# Patient Record
Sex: FEMALE | Race: WHITE | NOT HISPANIC OR LATINO | URBAN - METROPOLITAN AREA
[De-identification: names, ages, dates, MRNs, and addresses within clinical notes are randomized per-mention and may not be internally consistent; named-entity substitution may affect disease eponyms.]

---

## 2017-01-18 ENCOUNTER — OUTPATIENT (OUTPATIENT)
Dept: OUTPATIENT SERVICES | Facility: HOSPITAL | Age: 67
LOS: 1 days | End: 2017-01-18

## 2017-12-06 ENCOUNTER — OUTPATIENT (OUTPATIENT)
Dept: OUTPATIENT SERVICES | Facility: HOSPITAL | Age: 67
LOS: 1 days | End: 2017-12-06

## 2020-01-26 ENCOUNTER — TRANSCRIPTION ENCOUNTER (OUTPATIENT)
Age: 70
End: 2020-01-26

## 2020-01-26 ENCOUNTER — INPATIENT (INPATIENT)
Facility: HOSPITAL | Age: 70
LOS: 2 days | Discharge: HOME | DRG: 481 | End: 2020-01-29
Attending: GENERAL ACUTE CARE HOSPITAL | Admitting: GENERAL ACUTE CARE HOSPITAL
Payer: MEDICARE

## 2020-01-26 VITALS
OXYGEN SATURATION: 99 % | TEMPERATURE: 98 F | HEART RATE: 110 BPM | WEIGHT: 145.95 LBS | DIASTOLIC BLOOD PRESSURE: 65 MMHG | SYSTOLIC BLOOD PRESSURE: 120 MMHG | HEIGHT: 62 IN | RESPIRATION RATE: 20 BRPM

## 2020-01-26 DIAGNOSIS — S72.009A FRACTURE OF UNSPECIFIED PART OF NECK OF UNSPECIFIED FEMUR, INITIAL ENCOUNTER FOR CLOSED FRACTURE: ICD-10-CM

## 2020-01-26 LAB
ALBUMIN SERPL ELPH-MCNC: 4.2 G/DL — SIGNIFICANT CHANGE UP (ref 3.3–5.2)
ALP SERPL-CCNC: 92 U/L — SIGNIFICANT CHANGE UP (ref 40–120)
ALT FLD-CCNC: 27 U/L — SIGNIFICANT CHANGE UP
ANION GAP SERPL CALC-SCNC: 14 MMOL/L — SIGNIFICANT CHANGE UP (ref 5–17)
APTT BLD: 28.3 SEC — SIGNIFICANT CHANGE UP (ref 27.5–36.3)
AST SERPL-CCNC: 69 U/L — HIGH
BASOPHILS # BLD AUTO: 0.03 K/UL — SIGNIFICANT CHANGE UP (ref 0–0.2)
BASOPHILS NFR BLD AUTO: 0.3 % — SIGNIFICANT CHANGE UP (ref 0–2)
BILIRUB SERPL-MCNC: 0.6 MG/DL — SIGNIFICANT CHANGE UP (ref 0.4–2)
BLD GP AB SCN SERPL QL: SIGNIFICANT CHANGE UP
BUN SERPL-MCNC: 13 MG/DL — SIGNIFICANT CHANGE UP (ref 8–20)
CALCIUM SERPL-MCNC: 9.7 MG/DL — SIGNIFICANT CHANGE UP (ref 8.6–10.2)
CHLORIDE SERPL-SCNC: 100 MMOL/L — SIGNIFICANT CHANGE UP (ref 98–107)
CO2 SERPL-SCNC: 19 MMOL/L — LOW (ref 22–29)
CREAT SERPL-MCNC: 0.72 MG/DL — SIGNIFICANT CHANGE UP (ref 0.5–1.3)
EOSINOPHIL # BLD AUTO: 0.12 K/UL — SIGNIFICANT CHANGE UP (ref 0–0.5)
EOSINOPHIL NFR BLD AUTO: 1.3 % — SIGNIFICANT CHANGE UP (ref 0–6)
GLUCOSE SERPL-MCNC: 108 MG/DL — HIGH (ref 70–99)
HCT VFR BLD CALC: 38.3 % — SIGNIFICANT CHANGE UP (ref 34.5–45)
HGB BLD-MCNC: 12.8 G/DL — SIGNIFICANT CHANGE UP (ref 11.5–15.5)
IMM GRANULOCYTES NFR BLD AUTO: 0.4 % — SIGNIFICANT CHANGE UP (ref 0–1.5)
INR BLD: 1.02 RATIO — SIGNIFICANT CHANGE UP (ref 0.88–1.16)
LYMPHOCYTES # BLD AUTO: 1.02 K/UL — SIGNIFICANT CHANGE UP (ref 1–3.3)
LYMPHOCYTES # BLD AUTO: 10.7 % — LOW (ref 13–44)
MCHC RBC-ENTMCNC: 29.9 PG — SIGNIFICANT CHANGE UP (ref 27–34)
MCHC RBC-ENTMCNC: 33.4 GM/DL — SIGNIFICANT CHANGE UP (ref 32–36)
MCV RBC AUTO: 89.5 FL — SIGNIFICANT CHANGE UP (ref 80–100)
MONOCYTES # BLD AUTO: 0.71 K/UL — SIGNIFICANT CHANGE UP (ref 0–0.9)
MONOCYTES NFR BLD AUTO: 7.4 % — SIGNIFICANT CHANGE UP (ref 2–14)
NEUTROPHILS # BLD AUTO: 7.65 K/UL — HIGH (ref 1.8–7.4)
NEUTROPHILS NFR BLD AUTO: 79.9 % — HIGH (ref 43–77)
PLATELET # BLD AUTO: 203 K/UL — SIGNIFICANT CHANGE UP (ref 150–400)
POTASSIUM SERPL-MCNC: 4 MMOL/L — SIGNIFICANT CHANGE UP (ref 3.5–5.3)
POTASSIUM SERPL-SCNC: 4 MMOL/L — SIGNIFICANT CHANGE UP (ref 3.5–5.3)
PROT SERPL-MCNC: 7.3 G/DL — SIGNIFICANT CHANGE UP (ref 6.6–8.7)
PROTHROM AB SERPL-ACNC: 11.7 SEC — SIGNIFICANT CHANGE UP (ref 10–12.9)
RBC # BLD: 4.28 M/UL — SIGNIFICANT CHANGE UP (ref 3.8–5.2)
RBC # FLD: 12.9 % — SIGNIFICANT CHANGE UP (ref 10.3–14.5)
SODIUM SERPL-SCNC: 133 MMOL/L — LOW (ref 135–145)
WBC # BLD: 9.57 K/UL — SIGNIFICANT CHANGE UP (ref 3.8–10.5)
WBC # FLD AUTO: 9.57 K/UL — SIGNIFICANT CHANGE UP (ref 3.8–10.5)

## 2020-01-26 PROCEDURE — 99223 1ST HOSP IP/OBS HIGH 75: CPT

## 2020-01-26 PROCEDURE — 99221 1ST HOSP IP/OBS SF/LOW 40: CPT | Mod: 57

## 2020-01-26 PROCEDURE — 99285 EMERGENCY DEPT VISIT HI MDM: CPT

## 2020-01-26 PROCEDURE — 93010 ELECTROCARDIOGRAM REPORT: CPT

## 2020-01-26 PROCEDURE — 73502 X-RAY EXAM HIP UNI 2-3 VIEWS: CPT | Mod: 26,RT

## 2020-01-26 PROCEDURE — 73070 X-RAY EXAM OF ELBOW: CPT | Mod: 26,RT

## 2020-01-26 PROCEDURE — 73552 X-RAY EXAM OF FEMUR 2/>: CPT | Mod: 26,RT

## 2020-01-26 PROCEDURE — 76377 3D RENDER W/INTRP POSTPROCES: CPT | Mod: 26

## 2020-01-26 PROCEDURE — 72192 CT PELVIS W/O DYE: CPT | Mod: 26

## 2020-01-26 RX ORDER — ASPIRIN/CALCIUM CARB/MAGNESIUM 324 MG
81 TABLET ORAL DAILY
Refills: 0 | Status: DISCONTINUED | OUTPATIENT
Start: 2020-01-26 | End: 2020-01-26

## 2020-01-26 RX ORDER — CEFAZOLIN SODIUM 1 G
2000 VIAL (EA) INJECTION ONCE
Refills: 0 | Status: DISCONTINUED | OUTPATIENT
Start: 2020-01-26 | End: 2020-01-27

## 2020-01-26 RX ORDER — MORPHINE SULFATE 50 MG/1
1 CAPSULE, EXTENDED RELEASE ORAL EVERY 6 HOURS
Refills: 0 | Status: DISCONTINUED | OUTPATIENT
Start: 2020-01-26 | End: 2020-01-26

## 2020-01-26 RX ORDER — OXYCODONE HYDROCHLORIDE 5 MG/1
5 TABLET ORAL EVERY 4 HOURS
Refills: 0 | Status: DISCONTINUED | OUTPATIENT
Start: 2020-01-26 | End: 2020-01-26

## 2020-01-26 RX ORDER — OXYCODONE HYDROCHLORIDE 5 MG/1
10 TABLET ORAL EVERY 4 HOURS
Refills: 0 | Status: DISCONTINUED | OUTPATIENT
Start: 2020-01-26 | End: 2020-01-26

## 2020-01-26 RX ORDER — ASPIRIN/CALCIUM CARB/MAGNESIUM 324 MG
1 TABLET ORAL
Qty: 0 | Refills: 0 | DISCHARGE

## 2020-01-26 RX ORDER — LEVOTHYROXINE SODIUM 125 MCG
100 TABLET ORAL DAILY
Refills: 0 | Status: DISCONTINUED | OUTPATIENT
Start: 2020-01-26 | End: 2020-01-27

## 2020-01-26 RX ORDER — MORPHINE SULFATE 50 MG/1
4 CAPSULE, EXTENDED RELEASE ORAL ONCE
Refills: 0 | Status: DISCONTINUED | OUTPATIENT
Start: 2020-01-26 | End: 2020-01-26

## 2020-01-26 RX ORDER — ENOXAPARIN SODIUM 100 MG/ML
40 INJECTION SUBCUTANEOUS ONCE
Refills: 0 | Status: COMPLETED | OUTPATIENT
Start: 2020-01-26 | End: 2020-01-26

## 2020-01-26 RX ORDER — NICOTINE POLACRILEX 2 MG
1 GUM BUCCAL DAILY
Refills: 0 | Status: DISCONTINUED | OUTPATIENT
Start: 2020-01-26 | End: 2020-01-27

## 2020-01-26 RX ORDER — ACETAMINOPHEN 500 MG
650 TABLET ORAL EVERY 6 HOURS
Refills: 0 | Status: DISCONTINUED | OUTPATIENT
Start: 2020-01-26 | End: 2020-01-27

## 2020-01-26 RX ORDER — OXYCODONE AND ACETAMINOPHEN 5; 325 MG/1; MG/1
1 TABLET ORAL ONCE
Refills: 0 | Status: DISCONTINUED | OUTPATIENT
Start: 2020-01-26 | End: 2020-01-26

## 2020-01-26 RX ORDER — ATORVASTATIN CALCIUM 80 MG/1
10 TABLET, FILM COATED ORAL AT BEDTIME
Refills: 0 | Status: DISCONTINUED | OUTPATIENT
Start: 2020-01-26 | End: 2020-01-27

## 2020-01-26 RX ORDER — ATORVASTATIN CALCIUM 80 MG/1
10 TABLET, FILM COATED ORAL AT BEDTIME
Refills: 0 | Status: DISCONTINUED | OUTPATIENT
Start: 2020-01-26 | End: 2020-01-26

## 2020-01-26 RX ORDER — OXYCODONE HYDROCHLORIDE 5 MG/1
5 TABLET ORAL EVERY 6 HOURS
Refills: 0 | Status: DISCONTINUED | OUTPATIENT
Start: 2020-01-26 | End: 2020-01-27

## 2020-01-26 RX ORDER — MORPHINE SULFATE 50 MG/1
2 CAPSULE, EXTENDED RELEASE ORAL EVERY 4 HOURS
Refills: 0 | Status: DISCONTINUED | OUTPATIENT
Start: 2020-01-26 | End: 2020-01-27

## 2020-01-26 RX ORDER — SODIUM CHLORIDE 9 MG/ML
1000 INJECTION INTRAMUSCULAR; INTRAVENOUS; SUBCUTANEOUS
Refills: 0 | Status: DISCONTINUED | OUTPATIENT
Start: 2020-01-26 | End: 2020-01-27

## 2020-01-26 RX ORDER — LEVOTHYROXINE SODIUM 125 MCG
100 TABLET ORAL DAILY
Refills: 0 | Status: DISCONTINUED | OUTPATIENT
Start: 2020-01-26 | End: 2020-01-26

## 2020-01-26 RX ORDER — ATORVASTATIN CALCIUM 80 MG/1
1 TABLET, FILM COATED ORAL
Qty: 0 | Refills: 0 | DISCHARGE

## 2020-01-26 RX ORDER — LEVOTHYROXINE SODIUM 125 MCG
1 TABLET ORAL
Qty: 0 | Refills: 0 | DISCHARGE

## 2020-01-26 RX ORDER — SENNA PLUS 8.6 MG/1
2 TABLET ORAL AT BEDTIME
Refills: 0 | Status: DISCONTINUED | OUTPATIENT
Start: 2020-01-26 | End: 2020-01-27

## 2020-01-26 RX ADMIN — MORPHINE SULFATE 4 MILLIGRAM(S): 50 CAPSULE, EXTENDED RELEASE ORAL at 20:23

## 2020-01-26 RX ADMIN — MORPHINE SULFATE 4 MILLIGRAM(S): 50 CAPSULE, EXTENDED RELEASE ORAL at 19:53

## 2020-01-26 RX ADMIN — OXYCODONE AND ACETAMINOPHEN 1 TABLET(S): 5; 325 TABLET ORAL at 15:57

## 2020-01-26 RX ADMIN — ATORVASTATIN CALCIUM 10 MILLIGRAM(S): 80 TABLET, FILM COATED ORAL at 22:38

## 2020-01-26 RX ADMIN — ENOXAPARIN SODIUM 40 MILLIGRAM(S): 100 INJECTION SUBCUTANEOUS at 22:38

## 2020-01-26 NOTE — ED ADULT NURSE NOTE - OBJECTIVE STATEMENT
pt care assumed at 1530, no apparent distress noted at this time. pt received A&OX3 resting in bed comfortably. pt s/p fall down 4 steps. pt states she was at neighbors house last night and slipped down the stairs. pt c/o increased pain on movement. pt has bruising to right elbow. pt educated on plan of care, pt able to successfully teach back plan of care to RN, RN will continue to reeducate pt during hospital stay.

## 2020-01-26 NOTE — CONSULT NOTE ADULT - ATTENDING COMMENTS
Ortho Trauma Attending:  Agree with above PA note.  Note edited where necessary.  Orthopedic Surgery is ready to proceed with surgery pending medical optimization and adequate Operating Room availability. Risks of surgical delay discussed with other providers and staff. Please call with any questions or concerns.     Randy Beaver MD  Orthopaedic Trauma Surgery

## 2020-01-26 NOTE — ED STATDOCS - OBJECTIVE STATEMENT
69 year old female presenting to ED s/p fall x1 day ago c/o hip injury. States she was at a party and fell. Denies head trauma, LOC. 69 year old female presenting to ED s/p fall x1 day ago c/o hip injury. States she was at a party and fell. Denies head trauma, LOC. Unable to walk since injury 2/2 pain. No knee pain. No pain in other leg. No weakness or numbness in leg. No back pain. No history of prior injury to leg

## 2020-01-26 NOTE — ED STATDOCS - CLINICAL SUMMARY MEDICAL DECISION MAKING FREE TEXT BOX
pt with right hip after fall, neurovascular intact, concern for fracture, obtain XR, pain control, reassess

## 2020-01-26 NOTE — H&P ADULT - NSHPPHYSICALEXAM_GEN_ALL_CORE
Vital Signs Last 24 Hrs  T(C): 36.8 (26 Jan 2020 13:56), Max: 36.8 (26 Jan 2020 13:56)  T(F): 98.2 (26 Jan 2020 13:56), Max: 98.2 (26 Jan 2020 13:56)  HR: 84 (27 Jan 2020 01:29) (84 - 110)  BP: 124/76 (27 Jan 2020 01:29) (120/65 - 124/76)  BP(mean): --  RR: 17 (27 Jan 2020 01:29) (17 - 20)  SpO2: 97% (27 Jan 2020 01:29) (97% - 99%)    GENERAL:  Well-appearing, not in acute distress  EYES:  Clear conjunctiva, extraocular movement intact  ENT: Moist mucous membranes  RESP:  Non-labored breathing pattern, lungs clear to ausculation   CV: Regular rate and rhythm, no murmurs appreciated, no lower extremity edema  GI: Soft, non-tender, non-distended  MSK:  Right hip pain, unable to lift right leg due to pain, able to wiggle toes of right foot  NEURO: Awake, alert, conversant  PSYCH: Calm, cooperative  SKIN: No rash or lesions, warm and dry

## 2020-01-26 NOTE — ED ADULT NURSE REASSESSMENT NOTE - NS ED NURSE REASSESS COMMENT FT1
report received from off going RN, pt received A+Ox4, in no apparent distress, family at bedside. pt breathing even and unlabored. skin w/d/i. +peripheral pulses, brisk cap refill. pt c/o pain to R hip, to be medicated as ordered. pending admit orders/admit bed at this time. pt educated on POC, verbalized understanding. pt safety measures maintained.

## 2020-01-26 NOTE — H&P ADULT - NSHPLABSRESULTS_GEN_ALL_CORE
12.8   9.57  )-----------( 203      ( 26 Jan 2020 18:01 )             38.3       01-26    133<L>  |  100  |  13.0  ----------------------------<  108<H>  4.0   |  19.0<L>  |  0.72    Ca    9.7      26 Jan 2020 18:01    TPro  7.3  /  Alb  4.2  /  TBili  0.6  /  DBili  x   /  AST  69<H>  /  ALT  27  /  AlkPhos  92  01-26    EKG:  NSR, HR 98

## 2020-01-26 NOTE — H&P ADULT - ASSESSMENT
69yoF hx hypothyroidism, HLD presenting with right hip and right elbow pain after mechanical fall after slipping while walking barefoot down wet stairs, found to have comminuted intratrochanteric fracture of the right hip on CT pelvis    Intratrochanteric fracture of the right hip due to mechanical fall  -Admit to medicine   -Pain control with Tylenol and morphine PRN and ice packs  -Maintenance IVF ordered   -Bed rest  -Seen by ortho in ED, plan for surgical intervention tmrw  -NPO after midnight in anticipation of surgery   -Vitals, labs and EKG reviewed- pt has mild hyponatremia but this is not contraindication for surgery  -Prior to fall, pt functional, independent, has >4 METS as, able to walk up 2 flights of steps wihth dyspnea  -Pt is medically optimized for orthopedic procedure    Right elbow swelling  -Likely due to contusion due to fall  -X-ray of elbow pending    Hyponatremia   -Mild, admission Na 133  -On maintenance IVF, trend BMP  -Since >130, is not contraindication for surgery    HLD  -On lipitor     Hypothyroidism  - check TSH, resumed synthroid    Active smoker  -Nicotine patch    Prophylactic measure  -Intermittent pneumatic compressions 69yoF hx hypothyroidism, HLD presenting with right hip and right elbow pain after mechanical fall after slipping while walking barefoot down wet stairs, found to have comminuted intratrochanteric fracture of the right hip on CT pelvis    Intratrochanteric fracture of the right hip due to mechanical fall  -Admit to medicine   -Pain control with Tylenol and morphine PRN and ice packs  -Maintenance IVF ordered   -Bed rest  -Seen by ortho in ED, plan for surgical intervention tmrw  -NPO after midnight in anticipation of surgery   -Vitals, labs and EKG reviewed- pt has mild hyponatremia but this is not contraindication for surgery  -Prior to fall, pt functional, independent, has >4 METS as, able to walk up 2 flights of steps with dyspnea, RCRI score is 0 points   -Pt is medically optimized for orthopedic procedure    Right elbow swelling  -Likely due to contusion due to fall  -X-ray of elbow pending    Hyponatremia   -Mild, admission Na 133  -On maintenance IVF, trend BMP  -Since >130, is not contraindication for surgery    HLD  -On lipitor     Hypothyroidism  - check TSH, resumed synthroid    Active smoker  -Nicotine patch    Prophylactic measure  -Intermittent pneumatic compressions  -Held ASA in anticipation of orthopedic procedure

## 2020-01-26 NOTE — CONSULT NOTE ADULT - ASSESSMENT
Patient is a 69 year old female with pmh of hypothyroidism, hyperlipidemia coming to hospital with complaints of hip pain s/p fall. patient stating was at a neighbor party yesterday night when was talking to neighbor and fell down stairs around 1am last night. states as hurt to walk neighbors brought her home. states this morning pain was worse and came to hospital. states also has some pain on right elbow. denies any loc. denies cp sob nausea vomiting or diarrhea. states pain located mainly on right hip increases with movement decreases with rest. is sharp currently 2/10.     #right hip pain   - admit to medicine - 2 betzaida  - 2/2 right hip fracture 2/2 mechanical fall   - pain mgmt   - ivf  - npo after midnight  - ortho consult appreciated  - patient advise may need rehab post surgery    #right elbow pain  - may be 2/2 contusion vs other pathology  - imaging pending    #hld  - lipitor     #hypothyroidism  - check tsh   - synthroid    #dvt prophylaxis  - venodynes for now given or tomorrow am - start chemical a/c once clear by ortho  IMPROVE VTE Individual Risk Assessment  RISK                                                                Points  [  ] Previous VTE                                                  3  [  ] Thrombophilia                                               2  [  ] Lower limb paralysis                                      2        (unable to hold up >15 seconds)    [  ] Current Cancer                                              2         (within 6 months)  [x  ] Immobilization > 24 hrs                                1  [  ] ICU/CCU stay > 24 hours                              1  [x  ] Age > 60                                                      1    IMPROVE VTE Score __2_______    IMPROVE Score 0-1: Low Risk, No VTE prophylaxis required for most patients, encourage ambulation.   IMPROVE Score 2-3: At risk, pharmacologic VTE prophylaxis is indicated for most patients (in the absence of a contraindication)  IMPROVE Score > or = 4: High Risk, pharmacologic VTE prophylaxis is indicated for most patients (in the absence of a contraindication) Patient is a 69 year old female with pmh of hypothyroidism, hyperlipidemia coming to hospital with complaints of hip pain s/p fall. patient stating was at a neighbor party yesterday night when was talking to neighbor and fell down stairs around 1am last night. states as hurt to walk neighbors brought her home. states this morning pain was worse and came to hospital. states also has some pain on right elbow. denies any loc. denies cp sob nausea vomiting or diarrhea. states pain located mainly on right hip increases with movement decreases with rest. is sharp currently 2/10.     #right hip pain   - admit to medicine - 2 betzaida  - 2/2 right hip fracture 2/2 mechanical fall   - pain mgmt   - ivf  - npo after midnight  - ortho consult appreciated  - patient advise may need rehab post surgery    #right elbow pain  - may be 2/2 contusion vs other pathology  - imaging pending    #hld  - lipitor     #hypothyroidism  - check tsh   - synthroid    #Preoperative Assessment  Revised Cardiac Risk Assessment   [  ]History of ischemic heart disease   [  ]History of congestive heart failure   [  ]History of cerebrovascular disease (stroke or transient ischemic attack)   [  ]History of diabetes requiring preoperative insulin use   [  ]Chronic kidney disease (creatinine > 2 mg/dL)   [  ]Undergoing suprainguinal vascular, intraperitoneal, or intrathoracic surgery     0 predictors = 0.4%, 1 predictor = 1.0%, 2 predictors = 2.4%, > 3 predictors = 5.4%    * Overall this patient has a   0.4  % risk of cardiac death, nonfatal myocardial infarction, and nonfatal cardiac arrest perioperatively.     Patient does not have any known history of severe valvular disease and is not in decompensated CHF. No recent MI. Baseline functional capacity is > 4 METS. Patient is currently hemodynamically stable. Patient is medically optimized at this time and understands the inherent risks of surgery.     #dvt prophylaxis  - venodynes for now given or tomorrow am - start chemical a/c once clear by ortho  IMPROVE VTE Individual Risk Assessment  RISK                                                                Points  [  ] Previous VTE                                                  3  [  ] Thrombophilia                                               2  [  ] Lower limb paralysis                                      2        (unable to hold up >15 seconds)    [  ] Current Cancer                                              2         (within 6 months)  [x  ] Immobilization > 24 hrs                                1  [  ] ICU/CCU stay > 24 hours                              1  [x  ] Age > 60                                                      1    IMPROVE VTE Score __2_______    IMPROVE Score 0-1: Low Risk, No VTE prophylaxis required for most patients, encourage ambulation.   IMPROVE Score 2-3: At risk, pharmacologic VTE prophylaxis is indicated for most patients (in the absence of a contraindication)  IMPROVE Score > or = 4: High Risk, pharmacologic VTE prophylaxis is indicated for most patients (in the absence of a contraindication)

## 2020-01-26 NOTE — ED ADULT TRIAGE NOTE - CHIEF COMPLAINT QUOTE
slipped on 2 steps 1 am, rt hip pain, managed to get in the car, rt elbow pain with abrasion also. Knee pain

## 2020-01-26 NOTE — CONSULT NOTE ADULT - SUBJECTIVE AND OBJECTIVE BOX
REASON FOR Tele-evaluation; right hip and elbow pain    SUBJECTIVE: right hip and elbow pain    HPI:  Patient is a 69 year old female with pmh of hypothyroidism, hyperlipidemia coming to hospital with complaints of hip pain s/p fall. patient stating was at a neighbor party yesterday night when was talking to neighbor and fell down stairs around 1am last night. states as hurt to walk neighbors brought her home. states this morning pain was worse and came to hospital. states also has some pain on right elbow. denies any loc. denies cp sob nausea vomiting or diarrhea. states pain located mainly on right hip increases with movement decreases with rest. is sharp currently 2/10.       Review of Systems:  CONSTITUTIONAL: No weakness, fevers or chills  EYES/ENT: No visual changes;  No vertigo or throat pain   NECK: No pain or stiffness  RESPIRATORY: No cough, wheezing, hemoptysis; No shortness of breath,   CARDIOVASCULAR: No chest pain or palpitations  GASTROINTESTINAL: No abdominal or epigastric pain. No nausea, vomiting, or hematemesis; No diarrhea or constipation.   GENITOURINARY: No dysuria, frequency or hematuria  NEUROLOGICAL: No numbness or weakness  MSK +right hip and elbow pain  SKIN: No itching, burning, rashes, or lesions   All other review of systems is negative unless indicated above    T(C): 36.8 (01-26-20 @ 13:56), Max: 36.8 (01-26-20 @ 13:56)  HR: 110 (01-26-20 @ 13:56) (110 - 110)  BP: 120/65 (01-26-20 @ 13:56) (120/65 - 120/65)  RR: 20 (01-26-20 @ 13:56) (20 - 20)  SpO2: 99% (01-26-20 @ 13:56) (99% - 99%)    PHYSICAL EXAM: evaluation precludes physical exam. Pertinent physical exam findings as per video conference with nurse at bedside is as follow:  Patient able to answer all questions appropriately able to move left side of body without pain; laying in bed comfortable aaox 3    Radiology findings   < from: Xray Femur 2 Views, Right (01.26.20 @ 16:38) >  IMPRESSION:   Intertrochanteric fracture of the right femur.  < end of copied text >    < from: Xray Hip w/ Pelvis 2 or 3 Views, Right (01.26.20 @ 16:38) >  IMPRESSION:   Intertrochanteric fracture of the right femur.  < end of copied text >    Medication reconcillitation done     Care plan discussed with Dr Vincent REASON FOR Tele-evaluation; right hip and elbow pain    SUBJECTIVE: right hip and elbow pain    HPI:  Patient is a 69 year old female with pmh of hypothyroidism, hyperlipidemia coming to hospital with complaints of hip pain s/p fall. patient stating was at a neighbor party yesterday night when was talking to neighbor and fell down stairs around 1am last night. states as hurt to walk neighbors brought her home. states this morning pain was worse and came to hospital. states also has some pain on right elbow. denies any loc. denies cp sob nausea vomiting or diarrhea. states pain located mainly on right hip increases with movement decreases with rest. is sharp currently 2/10.     PSH  - breast surgery - for abscess    Family History  - thyroid disorder    Social History  Lives at home with family  Current Smoker 1ppd x 50 years   Denies illicit drug use  Social etoh use    Review of Systems:  CONSTITUTIONAL: No weakness, fevers or chills  EYES/ENT: No visual changes;  No vertigo or throat pain   NECK: No pain or stiffness  RESPIRATORY: No cough, wheezing, hemoptysis; No shortness of breath,   CARDIOVASCULAR: No chest pain or palpitations  GASTROINTESTINAL: No abdominal or epigastric pain. No nausea, vomiting, or hematemesis; No diarrhea or constipation.   GENITOURINARY: No dysuria, frequency or hematuria  NEUROLOGICAL: No numbness or weakness  MSK +right hip and elbow pain  SKIN: No itching, burning, rashes, or lesions   All other review of systems is negative unless indicated above    T(C): 36.8 (01-26-20 @ 13:56), Max: 36.8 (01-26-20 @ 13:56)  HR: 110 (01-26-20 @ 13:56) (110 - 110)  BP: 120/65 (01-26-20 @ 13:56) (120/65 - 120/65)  RR: 20 (01-26-20 @ 13:56) (20 - 20)  SpO2: 99% (01-26-20 @ 13:56) (99% - 99%)    PHYSICAL EXAM: evaluation precludes physical exam. Pertinent physical exam findings as per video conference with nurse at bedside is as follow:  Patient able to answer all questions appropriately able to move left side of body without pain; laying in bed comfortable aaox 3    Radiology findings   < from: Xray Femur 2 Views, Right (01.26.20 @ 16:38) >  IMPRESSION:   Intertrochanteric fracture of the right femur.  < end of copied text >    < from: Xray Hip w/ Pelvis 2 or 3 Views, Right (01.26.20 @ 16:38) >  IMPRESSION:   Intertrochanteric fracture of the right femur.  < end of copied text >    Medication reconcillitation done     Care plan discussed with Dr Vincent

## 2020-01-26 NOTE — CONSULT NOTE ADULT - SUBJECTIVE AND OBJECTIVE BOX
Pt Name: DINORA COULTER    MRN: [id number]      Patient is a 69y Female presenting to the emergency department with a chief complaint of right hip and right elbow pain s/p fall last night. Patient states she slipped on steps on a porch leaving a party. Patient states she tried to walk on it and had pain but thought it was just bruised. Decided this morning it was too painful and came to ER. Patient states she smokes a pack a day.         REVIEW OF SYSTEMS      General: Denies any CP, SOB	    Musculoskeletal:	 see HPI     ROS is otherwise negative.      PAST MEDICAL & SURGICAL HISTORY:      Allergies: No Known Allergies      Medications: oxyCODONE    IR 5 milliGRAM(s) Oral every 4 hours PRN  oxyCODONE    IR 10 milliGRAM(s) Oral every 4 hours PRN      FAMILY HISTORY:  : non-contributory    Social History:     Ambulation: Walking independently prior to injury                          12.8   9.57  )-----------( 203      ( 26 Jan 2020 18:01 )             38.3             PHYSICAL EXAM:    Vital Signs Last 24 Hrs  T(C): 36.8 (26 Jan 2020 13:56), Max: 36.8 (26 Jan 2020 13:56)  T(F): 98.2 (26 Jan 2020 13:56), Max: 98.2 (26 Jan 2020 13:56)  HR: 110 (26 Jan 2020 13:56) (110 - 110)  BP: 120/65 (26 Jan 2020 13:56) (120/65 - 120/65)  BP(mean): --  RR: 20 (26 Jan 2020 13:56) (20 - 20)  SpO2: 99% (26 Jan 2020 13:56) (99% - 99%)  Daily Height in cm: 157.48 (26 Jan 2020 13:56)    Daily     Appearance: Alert, responsive, in no acute distress.  Neurological: Sensation is grossly intact to light touch. No focal deficits or weaknesses found.  Vascular: 2+ distal pulses. Cap refill < 2 sec. No signs of venous insuffiencey or stasis. No extremity ulcerations. No cyanosis.  Musculoskeletal:    Right Upper Extremity: Right elbow has full ROM with beginning ecchymosis and small abrasion   Right Lower Extremity: Patient laying on her left side with her left leg under her right lower leg supporting it. Patient has good DP pulses and brisk cap refill full sensation to crude touch. Patient states she does not want to lay on her back to allow me to complete my exam.     Imaging Studies:  EXAM:  XR FEMUR 2 VIEWS RT                         EXAM:  XR HIP WITH PELV 2-3V RT                          PROCEDURE DATE:  01/26/2020          INTERPRETATION:  Radiographs of the right hip     CPT 63045    CLINICAL INFORMATION: Status post fall complaining of right hip pain of unknown severity.    TECHNIQUE:   Frontal and extension views of the hip as well as an AP view of the pelvis were obtained.    FINDINGS:   No prior exams are available for comparison.    There is an intertrochanteric fracture of the right femur. The femoral head remains in articulation with the acetabulum. The sacroiliac joints appear symmetric bilaterally.    IMPRESSION:    Intertrochanteric fracture of the right hip.                Radiographs of the right femur   CPT 59664    CLINICAL INFORMATION:   Pain.    TECHNIQUE:  Frontal and limited cross table lateral views of the femur were obtained.    FINDINGS:   No previous examinations are available for review.    There is an intertrochanteric fracture of the right femur. Vascular calcifications are seen in the distal right femur.  No radiopaque foreign body is seen.    IMPRESSION:   Intertrochanteric fracture of the right femur.          RUBEN DE LA CRUZ M.D., ATTENDING RADIOLOGIST  This document has been electronically signed. Jan 26 2020  4:46PM                A/P:  Pt is a  69y Female with right hip fx, and right elbow contusion     PLAN:   * NPO for OR tomorrow  * IV fluids ordered and to start once NPO  * Pre-operative ABX ordered  * Single dose anticoagulation ordered  * Medical clearance requested for procedure  * Bed rest

## 2020-01-27 ENCOUNTER — TRANSCRIPTION ENCOUNTER (OUTPATIENT)
Age: 70
End: 2020-01-27

## 2020-01-27 DIAGNOSIS — Z98.890 OTHER SPECIFIED POSTPROCEDURAL STATES: Chronic | ICD-10-CM

## 2020-01-27 LAB
ABO RH CONFIRMATION: SIGNIFICANT CHANGE UP
ANION GAP SERPL CALC-SCNC: 11 MMOL/L — SIGNIFICANT CHANGE UP (ref 5–17)
BASOPHILS # BLD AUTO: 0.02 K/UL — SIGNIFICANT CHANGE UP (ref 0–0.2)
BASOPHILS NFR BLD AUTO: 0.3 % — SIGNIFICANT CHANGE UP (ref 0–2)
BUN SERPL-MCNC: 11 MG/DL — SIGNIFICANT CHANGE UP (ref 8–20)
CALCIUM SERPL-MCNC: 9.6 MG/DL — SIGNIFICANT CHANGE UP (ref 8.6–10.2)
CHLORIDE SERPL-SCNC: 104 MMOL/L — SIGNIFICANT CHANGE UP (ref 98–107)
CO2 SERPL-SCNC: 22 MMOL/L — SIGNIFICANT CHANGE UP (ref 22–29)
CREAT SERPL-MCNC: 0.72 MG/DL — SIGNIFICANT CHANGE UP (ref 0.5–1.3)
EOSINOPHIL # BLD AUTO: 0.02 K/UL — SIGNIFICANT CHANGE UP (ref 0–0.5)
EOSINOPHIL NFR BLD AUTO: 0.3 % — SIGNIFICANT CHANGE UP (ref 0–6)
GLUCOSE SERPL-MCNC: 118 MG/DL — HIGH (ref 70–99)
HCT VFR BLD CALC: 35.3 % — SIGNIFICANT CHANGE UP (ref 34.5–45)
HCV AB S/CO SERPL IA: 0.22 S/CO — SIGNIFICANT CHANGE UP (ref 0–0.99)
HCV AB SERPL-IMP: SIGNIFICANT CHANGE UP
HGB BLD-MCNC: 11.6 G/DL — SIGNIFICANT CHANGE UP (ref 11.5–15.5)
IMM GRANULOCYTES NFR BLD AUTO: 0.5 % — SIGNIFICANT CHANGE UP (ref 0–1.5)
LYMPHOCYTES # BLD AUTO: 1.21 K/UL — SIGNIFICANT CHANGE UP (ref 1–3.3)
LYMPHOCYTES # BLD AUTO: 19.5 % — SIGNIFICANT CHANGE UP (ref 13–44)
MCHC RBC-ENTMCNC: 29.7 PG — SIGNIFICANT CHANGE UP (ref 27–34)
MCHC RBC-ENTMCNC: 32.9 GM/DL — SIGNIFICANT CHANGE UP (ref 32–36)
MCV RBC AUTO: 90.5 FL — SIGNIFICANT CHANGE UP (ref 80–100)
MONOCYTES # BLD AUTO: 0.61 K/UL — SIGNIFICANT CHANGE UP (ref 0–0.9)
MONOCYTES NFR BLD AUTO: 9.8 % — SIGNIFICANT CHANGE UP (ref 2–14)
NEUTROPHILS # BLD AUTO: 4.31 K/UL — SIGNIFICANT CHANGE UP (ref 1.8–7.4)
NEUTROPHILS NFR BLD AUTO: 69.6 % — SIGNIFICANT CHANGE UP (ref 43–77)
PLATELET # BLD AUTO: 181 K/UL — SIGNIFICANT CHANGE UP (ref 150–400)
POTASSIUM SERPL-MCNC: 3.9 MMOL/L — SIGNIFICANT CHANGE UP (ref 3.5–5.3)
POTASSIUM SERPL-SCNC: 3.9 MMOL/L — SIGNIFICANT CHANGE UP (ref 3.5–5.3)
RBC # BLD: 3.9 M/UL — SIGNIFICANT CHANGE UP (ref 3.8–5.2)
RBC # FLD: 13 % — SIGNIFICANT CHANGE UP (ref 10.3–14.5)
SODIUM SERPL-SCNC: 137 MMOL/L — SIGNIFICANT CHANGE UP (ref 135–145)
TSH SERPL-MCNC: 8.16 UIU/ML — HIGH (ref 0.27–4.2)
WBC # BLD: 6.2 K/UL — SIGNIFICANT CHANGE UP (ref 3.8–10.5)
WBC # FLD AUTO: 6.2 K/UL — SIGNIFICANT CHANGE UP (ref 3.8–10.5)

## 2020-01-27 PROCEDURE — 27095 INJECTION FOR HIP X-RAY: CPT | Mod: RT

## 2020-01-27 PROCEDURE — 99233 SBSQ HOSP IP/OBS HIGH 50: CPT

## 2020-01-27 PROCEDURE — 73502 X-RAY EXAM HIP UNI 2-3 VIEWS: CPT | Mod: 26,RT

## 2020-01-27 PROCEDURE — 27245 TREAT THIGH FRACTURE: CPT | Mod: RT

## 2020-01-27 RX ORDER — OXYCODONE HYDROCHLORIDE 5 MG/1
10 TABLET ORAL
Refills: 0 | Status: DISCONTINUED | OUTPATIENT
Start: 2020-01-27 | End: 2020-01-29

## 2020-01-27 RX ORDER — SODIUM CHLORIDE 9 MG/ML
1000 INJECTION, SOLUTION INTRAVENOUS
Refills: 0 | Status: DISCONTINUED | OUTPATIENT
Start: 2020-01-27 | End: 2020-01-27

## 2020-01-27 RX ORDER — CELECOXIB 200 MG/1
200 CAPSULE ORAL
Refills: 0 | Status: DISCONTINUED | OUTPATIENT
Start: 2020-01-29 | End: 2020-01-29

## 2020-01-27 RX ORDER — SODIUM CHLORIDE 9 MG/ML
1000 INJECTION, SOLUTION INTRAVENOUS
Refills: 0 | Status: DISCONTINUED | OUTPATIENT
Start: 2020-01-27 | End: 2020-01-29

## 2020-01-27 RX ORDER — ONDANSETRON 8 MG/1
4 TABLET, FILM COATED ORAL ONCE
Refills: 0 | Status: DISCONTINUED | OUTPATIENT
Start: 2020-01-27 | End: 2020-01-27

## 2020-01-27 RX ORDER — SENNA PLUS 8.6 MG/1
2 TABLET ORAL AT BEDTIME
Refills: 0 | Status: DISCONTINUED | OUTPATIENT
Start: 2020-01-27 | End: 2020-01-29

## 2020-01-27 RX ORDER — LEVOTHYROXINE SODIUM 125 MCG
100 TABLET ORAL DAILY
Refills: 0 | Status: DISCONTINUED | OUTPATIENT
Start: 2020-01-27 | End: 2020-01-29

## 2020-01-27 RX ORDER — ATORVASTATIN CALCIUM 80 MG/1
10 TABLET, FILM COATED ORAL AT BEDTIME
Refills: 0 | Status: DISCONTINUED | OUTPATIENT
Start: 2020-01-27 | End: 2020-01-29

## 2020-01-27 RX ORDER — HYDROMORPHONE HYDROCHLORIDE 2 MG/ML
0.5 INJECTION INTRAMUSCULAR; INTRAVENOUS; SUBCUTANEOUS EVERY 4 HOURS
Refills: 0 | Status: DISCONTINUED | OUTPATIENT
Start: 2020-01-27 | End: 2020-01-27

## 2020-01-27 RX ORDER — OXYCODONE HYDROCHLORIDE 5 MG/1
5 TABLET ORAL
Refills: 0 | Status: DISCONTINUED | OUTPATIENT
Start: 2020-01-27 | End: 2020-01-29

## 2020-01-27 RX ORDER — ACETAMINOPHEN 500 MG
975 TABLET ORAL EVERY 8 HOURS
Refills: 0 | Status: DISCONTINUED | OUTPATIENT
Start: 2020-01-27 | End: 2020-01-29

## 2020-01-27 RX ORDER — FENTANYL CITRATE 50 UG/ML
25 INJECTION INTRAVENOUS
Refills: 0 | Status: DISCONTINUED | OUTPATIENT
Start: 2020-01-27 | End: 2020-01-27

## 2020-01-27 RX ORDER — HYDROMORPHONE HYDROCHLORIDE 2 MG/ML
1 INJECTION INTRAMUSCULAR; INTRAVENOUS; SUBCUTANEOUS EVERY 4 HOURS
Refills: 0 | Status: DISCONTINUED | OUTPATIENT
Start: 2020-01-27 | End: 2020-01-27

## 2020-01-27 RX ORDER — ASPIRIN/CALCIUM CARB/MAGNESIUM 324 MG
81 TABLET ORAL DAILY
Refills: 0 | Status: DISCONTINUED | OUTPATIENT
Start: 2020-01-28 | End: 2020-01-29

## 2020-01-27 RX ORDER — HYDROMORPHONE HYDROCHLORIDE 2 MG/ML
0.5 INJECTION INTRAMUSCULAR; INTRAVENOUS; SUBCUTANEOUS EVERY 4 HOURS
Refills: 0 | Status: DISCONTINUED | OUTPATIENT
Start: 2020-01-27 | End: 2020-01-29

## 2020-01-27 RX ORDER — ENOXAPARIN SODIUM 100 MG/ML
40 INJECTION SUBCUTANEOUS EVERY 24 HOURS
Refills: 0 | Status: DISCONTINUED | OUTPATIENT
Start: 2020-01-28 | End: 2020-01-29

## 2020-01-27 RX ADMIN — MORPHINE SULFATE 2 MILLIGRAM(S): 50 CAPSULE, EXTENDED RELEASE ORAL at 10:06

## 2020-01-27 RX ADMIN — OXYCODONE HYDROCHLORIDE 5 MILLIGRAM(S): 5 TABLET ORAL at 22:42

## 2020-01-27 RX ADMIN — Medication 975 MILLIGRAM(S): at 22:12

## 2020-01-27 RX ADMIN — Medication 975 MILLIGRAM(S): at 22:42

## 2020-01-27 RX ADMIN — HYDROMORPHONE HYDROCHLORIDE 1 MILLIGRAM(S): 2 INJECTION INTRAMUSCULAR; INTRAVENOUS; SUBCUTANEOUS at 11:48

## 2020-01-27 RX ADMIN — MORPHINE SULFATE 2 MILLIGRAM(S): 50 CAPSULE, EXTENDED RELEASE ORAL at 01:26

## 2020-01-27 RX ADMIN — Medication 100 MICROGRAM(S): at 05:38

## 2020-01-27 RX ADMIN — ATORVASTATIN CALCIUM 10 MILLIGRAM(S): 80 TABLET, FILM COATED ORAL at 22:12

## 2020-01-27 RX ADMIN — OXYCODONE HYDROCHLORIDE 5 MILLIGRAM(S): 5 TABLET ORAL at 07:54

## 2020-01-27 RX ADMIN — MORPHINE SULFATE 2 MILLIGRAM(S): 50 CAPSULE, EXTENDED RELEASE ORAL at 07:00

## 2020-01-27 RX ADMIN — SODIUM CHLORIDE 75 MILLILITER(S): 9 INJECTION INTRAMUSCULAR; INTRAVENOUS; SUBCUTANEOUS at 06:33

## 2020-01-27 RX ADMIN — MORPHINE SULFATE 2 MILLIGRAM(S): 50 CAPSULE, EXTENDED RELEASE ORAL at 11:30

## 2020-01-27 RX ADMIN — OXYCODONE HYDROCHLORIDE 5 MILLIGRAM(S): 5 TABLET ORAL at 22:12

## 2020-01-27 RX ADMIN — OXYCODONE HYDROCHLORIDE 5 MILLIGRAM(S): 5 TABLET ORAL at 08:15

## 2020-01-27 RX ADMIN — OXYCODONE HYDROCHLORIDE 5 MILLIGRAM(S): 5 TABLET ORAL at 18:22

## 2020-01-27 RX ADMIN — MORPHINE SULFATE 2 MILLIGRAM(S): 50 CAPSULE, EXTENDED RELEASE ORAL at 05:37

## 2020-01-27 RX ADMIN — SENNA PLUS 2 TABLET(S): 8.6 TABLET ORAL at 22:12

## 2020-01-27 NOTE — DISCHARGE NOTE PROVIDER - NSDCMRMEDTOKEN_GEN_ALL_CORE_FT
aspirin 81 mg oral tablet: 1 tab(s) orally once a day  Lipitor 10 mg oral tablet: 1 tab(s) orally once a day (at bedtime)  Synthroid 100 mcg (0.1 mg) oral tablet: 1 tab(s) orally once a day aspirin 81 mg oral tablet: 1 tab(s) orally once a day  Lipitor 10 mg oral tablet: 1 tab(s) orally once a day (at bedtime)  Lovenox 40 mg/0.4 mL injectable solution: 40 milligram(s) subcutaneously once a day   Multiple Vitamins oral tablet: 1 tab(s) orally once a day  oxyCODONE 10 mg oral tablet: 1 tab(s) orally every 3 hours, As needed, Moderate Pain (4 - 6)  oxyCODONE 5 mg oral tablet: 1 tab(s) orally every 6 hours, As Needed -Mild Pain (1 - 3) MDD:max 4 tabs daily  senna oral tablet: 2 tab(s) orally once a day (at bedtime), As Needed   Synthroid 100 mcg (0.1 mg) oral tablet: 1 tab(s) orally once a day

## 2020-01-27 NOTE — BRIEF OPERATIVE NOTE - NSICDXBRIEFPROCEDURE_GEN_ALL_CORE_FT
PROCEDURES:  Insertion of intramedullary nail for fixation of intertrochanteric fracture of femur 27-Jan-2020 13:55:26  Genna Chappell  Intramedullary nailing, femur, intertrochanteric, antegrade 27-Jan-2020 13:50:45  Genna Chappell

## 2020-01-27 NOTE — DISCHARGE NOTE PROVIDER - NSDCCPCAREPLAN_GEN_ALL_CORE_FT
PRINCIPAL DISCHARGE DIAGNOSIS  Diagnosis: Hip fracture  Assessment and Plan of Treatment: please take pain medication only as needed  you may also use NSAIDS for pain as needed  take senna as directed while on narcotics  please take lovenox injection as directed  follow up with Dr. Beaver within 1 week      SECONDARY DISCHARGE DIAGNOSES  Diagnosis: HLD (hyperlipidemia)  Assessment and Plan of Treatment: continue statin    Diagnosis: Hypothyroidism  Assessment and Plan of Treatment: please continue synthroid as discussed and follow up with endo as scheduled to repeat labs and discuss medication adjustment if needed

## 2020-01-27 NOTE — DISCHARGE NOTE PROVIDER - NSDCFUADDINST_GEN_ALL_CORE_FT
ORTHOPEDIC FOLLOW UP RECOMMENDATIONS: The patient will be seen in the office on 2/14/20 for wound check. Sutures/Staples/Tape will be removed at that time. Patient may shower after post-op day #5. The dressing is to be removed on post op day #9 (2/5/20). IF THE DRESSING BECOMES SOILED BEFORE THE REMOVAL DATE, CHANGE WITH A SIMILAR DRESSING. IF THE DRESSING BECOMES STAINED WITH DISCHARGE, CONTACT THE OFFICE FOR FURTHER DIRECTIONS.  The patient will contact the office if the wound becomes red, has increasing pain, develops bleeding or discharge, an injury occurs, or has other concerns. The patient will continue PT for gait training. The patient will continue LOVENOX for 4 weeks for blood clot prevention. The patient will take OXYCODONE AND TYLENOL for pain control and titrate according to prescription and patient needs. The patient will take Colace while taking oxycodone to prevent narcotic associated constipation.  Additionally, increase water intake (drink at least 8 glasses of water daily) and try adding fiber to the diet by eating fruits, vegetables and foods that are rich in grains. If constipation is experienced, contact the medical/primary care provider to discuss further treatment options. The patient is FULL weight bearing.

## 2020-01-27 NOTE — DISCHARGE NOTE PROVIDER - HOSPITAL COURSE
69yoF hx hypothyroidism, HLD presenting with right hip and right elbow pain after mechanical fall after slipping while walking barefoot down wet stairs, found to have comminuted intratrochanteric fracture of the right hip on CT pelvis s/p IMN.        Intratrochanteric fracture of the right hip due to mechanical fall    -s/p IMN POD #2    -Continue analgesia as prescribed     -PT evaluation - home    -dvt ppx as prescribed x 4 weeks    -ortho consult appreciated        Anemia    -likely due to blood loss; no large hematoma on exam    -Hb stable        Right elbow contusion    -X-ray of elbow - normal ROM, denies pain    -discussed with ortho PA; no further imaging required        Hyponatremia     -resolved s/p IVF        HLD    -On lipitor         Hypothyroidism    - TSH elevated; normal T3/T4    -  continue synthroid at current dose, outpatient follow up with endo as discussed with patient        Active smoker    -smoking cessation        Medically stable for discharge home. Time spent on discharge 45 minutes.

## 2020-01-27 NOTE — PROGRESS NOTE ADULT - SUBJECTIVE AND OBJECTIVE BOX
Orthopedic PA Postop Note  Patient S/P RIGHT HIP IM NAIL  Patient in bed comfortable   RIGHT  Leg  Dressing C/D/I  DP Pulse intact  Calf Soft NT  Dorsi/Plantar Flexion/EHL/FHL Intact  Sensation intact to light touch    Vital Signs Last 24 Hrs  T(C): 36.9 (27 Jan 2020 16:38), Max: 37.3 (27 Jan 2020 04:31)  T(F): 98.5 (27 Jan 2020 16:38), Max: 99.1 (27 Jan 2020 04:31)  HR: 109 (27 Jan 2020 16:38) (83 - 109)  BP: 119/62 (27 Jan 2020 16:38) (115/68 - 153/75)  BP(mean): --  RR: 20 (27 Jan 2020 16:38) (14 - 20)  SpO2: 94% (27 Jan 2020 16:38) (94% - 97%)      A/P:  S/P LEFT HIP IM NAIL  1. DVTP - LOVENOX  2. Physical Therapy   3. Pain Control as clinically indicated

## 2020-01-27 NOTE — PROGRESS NOTE ADULT - SUBJECTIVE AND OBJECTIVE BOX
CHIEF COMPLAINT/INTERVAL HISTORY:    Patient is a 69y old  Female who presents with a chief complaint of Comminuted intratrochanteric fracture of the right hip (26 Jan 2020 22:48)      HPI:  69yoF hx hypothyroidism, HLD presenting with mechanical fall causing right hip fracture.  Pt was at neighbor’s party last night, reports she had took off her shoes and had been walking barefoot to her house which was across the street but because the steps were slippery due to rain, she ended up falling down about 4 steps with impact to her right hip and elbow.  Pt denies any prodrome or loss of consciousness.  After the fall, she had significant right hip pain and difficulty bearing weight on her right leg.  She also endorses some right elbow pain and swelling. On admission, pt had X ray hip and femur and CT pelvis performed which showed comminuted intratrochanteric fracture of the right hip. (26 Jan 2020 22:48)      SUBJECTIVE & OBJECTIVE: Pt seen and examined at bedside.     ICU Vital Signs Last 24 Hrs  T(C): 36.4 (27 Jan 2020 12:05), Max: 37.3 (27 Jan 2020 04:31)  T(F): 97.6 (27 Jan 2020 12:05), Max: 99.1 (27 Jan 2020 04:31)  HR: 84 (27 Jan 2020 12:05) (84 - 98)  BP: 124/67 (27 Jan 2020 12:05) (115/68 - 129/77)  BP(mean): --  ABP: --  ABP(mean): --  RR: 16 (27 Jan 2020 12:05) (16 - 18)  SpO2: 95% (27 Jan 2020 12:05) (94% - 97%)        MEDICATIONS  (STANDING):    MEDICATIONS  (PRN):      LABS:                        11.6   6.20  )-----------( 181      ( 27 Jan 2020 07:01 )             35.3     01-27    137  |  104  |  11.0  ----------------------------<  118<H>  3.9   |  22.0  |  0.72    Ca    9.6      27 Jan 2020 07:01    TPro  7.3  /  Alb  4.2  /  TBili  0.6  /  DBili  x   /  AST  69<H>  /  ALT  27  /  AlkPhos  92  01-26    PT/INR - ( 26 Jan 2020 18:01 )   PT: 11.7 sec;   INR: 1.02 ratio         PTT - ( 26 Jan 2020 18:01 )  PTT:28.3 sec      PHYSICAL EXAM:    GENERAL: elderly female, laying in bed, NAD  HEAD:  Atraumatic, Normocephalic  EYES: EOMI, PERRLA, conjunctiva and sclera clear  ENMT: Moist mucous membranes  NECK: Supple   NERVOUS SYSTEM:  Alert & Oriented X3   CHEST/LUNG: Clear to auscultation bilaterally; No rales, rhonchi, wheezing, or rubs  HEART: Regular rate and rhythm; + S1/S2  ABDOMEN: Soft, Nontender, Nondistended; Bowel sounds present  EXTREMITIES:  no pedal edema

## 2020-01-27 NOTE — DISCHARGE NOTE PROVIDER - CARE PROVIDER_API CALL
Randy Beaver)  Orthopaedic Surgery  [address] [place] [address]  Phone: [phone]  Fax: [phone]  Follow Up Time:

## 2020-01-28 LAB
ANION GAP SERPL CALC-SCNC: 11 MMOL/L — SIGNIFICANT CHANGE UP (ref 5–17)
BASOPHILS # BLD AUTO: 0.03 K/UL — SIGNIFICANT CHANGE UP (ref 0–0.2)
BASOPHILS NFR BLD AUTO: 0.7 % — SIGNIFICANT CHANGE UP (ref 0–2)
BUN SERPL-MCNC: 9 MG/DL — SIGNIFICANT CHANGE UP (ref 8–20)
CALCIUM SERPL-MCNC: 8.7 MG/DL — SIGNIFICANT CHANGE UP (ref 8.6–10.2)
CHLORIDE SERPL-SCNC: 105 MMOL/L — SIGNIFICANT CHANGE UP (ref 98–107)
CO2 SERPL-SCNC: 23 MMOL/L — SIGNIFICANT CHANGE UP (ref 22–29)
CREAT SERPL-MCNC: 0.68 MG/DL — SIGNIFICANT CHANGE UP (ref 0.5–1.3)
EOSINOPHIL # BLD AUTO: 0.07 K/UL — SIGNIFICANT CHANGE UP (ref 0–0.5)
EOSINOPHIL NFR BLD AUTO: 1.6 % — SIGNIFICANT CHANGE UP (ref 0–6)
GLUCOSE SERPL-MCNC: 97 MG/DL — SIGNIFICANT CHANGE UP (ref 70–99)
HCT VFR BLD CALC: 30.3 % — LOW (ref 34.5–45)
HGB BLD-MCNC: 9.9 G/DL — LOW (ref 11.5–15.5)
IMM GRANULOCYTES NFR BLD AUTO: 0.5 % — SIGNIFICANT CHANGE UP (ref 0–1.5)
LYMPHOCYTES # BLD AUTO: 0.96 K/UL — LOW (ref 1–3.3)
LYMPHOCYTES # BLD AUTO: 21.9 % — SIGNIFICANT CHANGE UP (ref 13–44)
MAGNESIUM SERPL-MCNC: 1.6 MG/DL — SIGNIFICANT CHANGE UP (ref 1.6–2.6)
MCHC RBC-ENTMCNC: 30.6 PG — SIGNIFICANT CHANGE UP (ref 27–34)
MCHC RBC-ENTMCNC: 32.7 GM/DL — SIGNIFICANT CHANGE UP (ref 32–36)
MCV RBC AUTO: 93.5 FL — SIGNIFICANT CHANGE UP (ref 80–100)
MONOCYTES # BLD AUTO: 0.43 K/UL — SIGNIFICANT CHANGE UP (ref 0–0.9)
MONOCYTES NFR BLD AUTO: 9.8 % — SIGNIFICANT CHANGE UP (ref 2–14)
NEUTROPHILS # BLD AUTO: 2.88 K/UL — SIGNIFICANT CHANGE UP (ref 1.8–7.4)
NEUTROPHILS NFR BLD AUTO: 65.5 % — SIGNIFICANT CHANGE UP (ref 43–77)
PHOSPHATE SERPL-MCNC: 2 MG/DL — LOW (ref 2.4–4.7)
PLATELET # BLD AUTO: 149 K/UL — LOW (ref 150–400)
POTASSIUM SERPL-MCNC: 4.2 MMOL/L — SIGNIFICANT CHANGE UP (ref 3.5–5.3)
POTASSIUM SERPL-SCNC: 4.2 MMOL/L — SIGNIFICANT CHANGE UP (ref 3.5–5.3)
RBC # BLD: 3.24 M/UL — LOW (ref 3.8–5.2)
RBC # FLD: 13 % — SIGNIFICANT CHANGE UP (ref 10.3–14.5)
SODIUM SERPL-SCNC: 139 MMOL/L — SIGNIFICANT CHANGE UP (ref 135–145)
T3FREE SERPL-MCNC: 2.44 PG/ML — SIGNIFICANT CHANGE UP (ref 1.8–4.6)
T4 FREE SERPL-MCNC: 2.1 NG/DL — HIGH (ref 0.9–1.8)
WBC # BLD: 4.39 K/UL — SIGNIFICANT CHANGE UP (ref 3.8–10.5)
WBC # FLD AUTO: 4.39 K/UL — SIGNIFICANT CHANGE UP (ref 3.8–10.5)

## 2020-01-28 PROCEDURE — 99232 SBSQ HOSP IP/OBS MODERATE 35: CPT

## 2020-01-28 RX ADMIN — OXYCODONE HYDROCHLORIDE 10 MILLIGRAM(S): 5 TABLET ORAL at 13:53

## 2020-01-28 RX ADMIN — Medication 975 MILLIGRAM(S): at 05:55

## 2020-01-28 RX ADMIN — OXYCODONE HYDROCHLORIDE 10 MILLIGRAM(S): 5 TABLET ORAL at 10:27

## 2020-01-28 RX ADMIN — Medication 975 MILLIGRAM(S): at 13:53

## 2020-01-28 RX ADMIN — ENOXAPARIN SODIUM 40 MILLIGRAM(S): 100 INJECTION SUBCUTANEOUS at 05:55

## 2020-01-28 RX ADMIN — Medication 100 MICROGRAM(S): at 05:55

## 2020-01-28 RX ADMIN — OXYCODONE HYDROCHLORIDE 10 MILLIGRAM(S): 5 TABLET ORAL at 13:09

## 2020-01-28 RX ADMIN — OXYCODONE HYDROCHLORIDE 10 MILLIGRAM(S): 5 TABLET ORAL at 05:55

## 2020-01-28 RX ADMIN — Medication 975 MILLIGRAM(S): at 22:58

## 2020-01-28 RX ADMIN — Medication 975 MILLIGRAM(S): at 22:28

## 2020-01-28 RX ADMIN — Medication 975 MILLIGRAM(S): at 06:25

## 2020-01-28 RX ADMIN — OXYCODONE HYDROCHLORIDE 10 MILLIGRAM(S): 5 TABLET ORAL at 23:08

## 2020-01-28 RX ADMIN — OXYCODONE HYDROCHLORIDE 10 MILLIGRAM(S): 5 TABLET ORAL at 19:52

## 2020-01-28 RX ADMIN — Medication 1 TABLET(S): at 13:53

## 2020-01-28 RX ADMIN — SENNA PLUS 2 TABLET(S): 8.6 TABLET ORAL at 22:29

## 2020-01-28 RX ADMIN — OXYCODONE HYDROCHLORIDE 10 MILLIGRAM(S): 5 TABLET ORAL at 23:38

## 2020-01-28 RX ADMIN — OXYCODONE HYDROCHLORIDE 10 MILLIGRAM(S): 5 TABLET ORAL at 20:22

## 2020-01-28 RX ADMIN — OXYCODONE HYDROCHLORIDE 10 MILLIGRAM(S): 5 TABLET ORAL at 06:25

## 2020-01-28 RX ADMIN — ATORVASTATIN CALCIUM 10 MILLIGRAM(S): 80 TABLET, FILM COATED ORAL at 22:29

## 2020-01-28 RX ADMIN — Medication 62.5 MILLIMOLE(S): at 14:06

## 2020-01-28 RX ADMIN — Medication 81 MILLIGRAM(S): at 13:53

## 2020-01-28 NOTE — PHYSICAL THERAPY INITIAL EVALUATION ADULT - IMPAIRMENTS CONTRIBUTING TO GAIT DEVIATIONS, PT EVAL
impaired balance/decreased ROM/impaired coordination/pain/decreased flexibility/decreased strength/impaired motor control

## 2020-01-28 NOTE — PROGRESS NOTE ADULT - SUBJECTIVE AND OBJECTIVE BOX
DINORA COULTER    [id number]    History: Patient is status post right hip IM nail, POD#1.  Patient is doing well. The patient's pain is controlled using the prescribed pain medications. The patient will be participating in physical therapy, WBAT RLE. Denies nausea, vomiting, chest pain, shortness of breath, abdominal pain or fever. No new complaints.    Vital Signs Last 24 Hrs  T(C): 36.8 (28 Jan 2020 04:30), Max: 37.1 (27 Jan 2020 21:29)  T(F): 98.3 (28 Jan 2020 04:30), Max: 98.7 (27 Jan 2020 21:29)  HR: 97 (28 Jan 2020 04:30) (83 - 109)  BP: 117/68 (28 Jan 2020 04:30) (103/57 - 153/75)  BP(mean): --  RR: 18 (28 Jan 2020 04:30) (14 - 20)  SpO2: 95% (28 Jan 2020 04:30) (94% - 97%)                          11.6   6.20  )-----------( 181      ( 27 Jan 2020 07:01 )             35.3       01-27    137  |  104  |  11.0  ----------------------------<  118<H>  3.9   |  22.0  |  0.72    Ca    9.6      27 Jan 2020 07:01    TPro  7.3  /  Alb  4.2  /  TBili  0.6  /  DBili  x   /  AST  69<H>  /  ALT  27  /  AlkPhos  92  01-26      MEDICATIONS  (STANDING):  acetaminophen   Tablet .. 975 milliGRAM(s) Oral every 8 hours  aspirin  chewable 81 milliGRAM(s) Oral daily  atorvastatin 10 milliGRAM(s) Oral at bedtime  enoxaparin Injectable 40 milliGRAM(s) SubCutaneous every 24 hours  lactated ringers. 1000 milliLiter(s) (120 mL/Hr) IV Continuous <Continuous>  levothyroxine 100 MICROGram(s) Oral daily  multivitamin 1 Tablet(s) Oral daily  senna 2 Tablet(s) Oral at bedtime    MEDICATIONS  (PRN):  HYDROmorphone  Injectable 0.5 milliGRAM(s) IV Push every 4 hours PRN Breakthrough Pain  oxyCODONE    IR 10 milliGRAM(s) Oral every 3 hours PRN Moderate Pain (4 - 6)  oxyCODONE    IR 5 milliGRAM(s) Oral every 3 hours PRN Mild Pain (1 - 3)      Physical exam: The right hip surgical  dressing remains clean, dry and intact. No drainage or discharge noted on dressings. No erythema is noted. No blistering. No ecchymosis. The calf is supple nontender. Passive range of motion is acceptable to due postoperative pain. No calf tenderness. Sensation to light touch is grossly intact distally. Motor function distally is 5/5. No foot drop. 2+ dorsalis pedis pulse. Capillary refill is less than 2 seconds. No cyanosis.   Right elbow has area of ecchymosis at olecranon.  She has full painless range of motion. 5/5 strength    Primary Orthopedic Assessment:  • s/p RIGHT hip IM nail  Secondary  Orthopedic Assessment(s):   •     Secondary  Medical Assessment(s):   •     Plan:   • DVT prophylaxis as prescribed, including use of compression devices and ankle pumps  • Continue physical therapy, WBAT  • Weightbearing as tolerated of the right lower extremity with assistance of a walker  • Incentive spirometry encouraged  • Pain control as clinically indicated  • Discharge planning – anticipated discharge is Home once cleared by Physical therapy and medicine

## 2020-01-28 NOTE — PHYSICAL THERAPY INITIAL EVALUATION ADULT - CRITERIA FOR SKILLED THERAPEUTIC INTERVENTIONS
impairments found/functional limitations in following categories/therapy frequency/anticipated equipment needs at discharge/anticipated discharge recommendation/predicted duration of therapy intervention/risk reduction/prevention/rehab potential

## 2020-01-28 NOTE — PHYSICAL THERAPY INITIAL EVALUATION ADULT - ACTIVE RANGE OF MOTION EXAMINATION, REHAB EVAL
Right knee flexion to 90 degrees/bilateral  lower extremity Active ROM was WFL (within functional limits)

## 2020-01-28 NOTE — PROGRESS NOTE ADULT - SUBJECTIVE AND OBJECTIVE BOX
CHIEF COMPLAINT/INTERVAL HISTORY:    Patient is a 69y old  Female who presents with a chief complaint of Comminuted intratrochanteric fracture of the right hip (28 Jan 2020 07:18)      HPI:  69yoF hx hypothyroidism, HLD presenting with mechanical fall causing right hip fracture.  Pt was at neighbor’s party last night, reports she had took off her shoes and had been walking barefoot to her house which was across the street but because the steps were slippery due to rain, she ended up falling down about 4 steps with impact to her right hip and elbow.  Pt denies any prodrome or loss of consciousness.  After the fall, she had significant right hip pain and difficulty bearing weight on her right leg.  She also endorses some right elbow pain and swelling. On admission, pt had X ray hip and femur and CT pelvis performed which showed comminuted intratrochanteric fracture of the right hip. (26 Jan 2020 22:48)      SUBJECTIVE & OBJECTIVE: Pt seen and examined at bedside.     ICU Vital Signs Last 24 Hrs  T(C): 36.8 (28 Jan 2020 16:25), Max: 37.1 (27 Jan 2020 21:29)  T(F): 98.2 (28 Jan 2020 16:25), Max: 98.7 (27 Jan 2020 21:29)  HR: 87 (28 Jan 2020 16:25) (84 - 97)  BP: 100/57 (28 Jan 2020 16:25) (93/57 - 121/67)  BP(mean): --  ABP: --  ABP(mean): --  RR: 17 (28 Jan 2020 16:25) (17 - 19)  SpO2: 95% (28 Jan 2020 16:25) (90% - 96%)        MEDICATIONS  (STANDING):  acetaminophen   Tablet .. 975 milliGRAM(s) Oral every 8 hours  aspirin  chewable 81 milliGRAM(s) Oral daily  atorvastatin 10 milliGRAM(s) Oral at bedtime  enoxaparin Injectable 40 milliGRAM(s) SubCutaneous every 24 hours  lactated ringers. 1000 milliLiter(s) (120 mL/Hr) IV Continuous <Continuous>  levothyroxine 100 MICROGram(s) Oral daily  multivitamin 1 Tablet(s) Oral daily  senna 2 Tablet(s) Oral at bedtime    MEDICATIONS  (PRN):  HYDROmorphone  Injectable 0.5 milliGRAM(s) IV Push every 4 hours PRN Breakthrough Pain  oxyCODONE    IR 10 milliGRAM(s) Oral every 3 hours PRN Moderate Pain (4 - 6)  oxyCODONE    IR 5 milliGRAM(s) Oral every 3 hours PRN Mild Pain (1 - 3)      LABS:                        9.9    4.39  )-----------( 149      ( 28 Jan 2020 07:14 )             30.3     01-28    139  |  105  |  9.0  ----------------------------<  97  4.2   |  23.0  |  0.68    Ca    8.7      28 Jan 2020 07:14  Phos  2.0     01-28  Mg     1.6     01-28    TPro  7.3  /  Alb  4.2  /  TBili  0.6  /  DBili  x   /  AST  69<H>  /  ALT  27  /  AlkPhos  92  01-26    PT/INR - ( 26 Jan 2020 18:01 )   PT: 11.7 sec;   INR: 1.02 ratio         PTT - ( 26 Jan 2020 18:01 )  PTT:28.3 sec      CAPILLARY BLOOD GLUCOSE          RECENT CULTURES:      RADIOLOGY & ADDITIONAL TESTS:      PHYSICAL EXAM:    GENERAL: NAD, well-groomed, well-developed  HEAD:  Atraumatic, Normocephalic  EYES: EOMI, PERRLA, conjunctiva and sclera clear  ENMT: Moist mucous membranes  NECK: Supple, No JVD  NERVOUS SYSTEM:  Alert & Oriented X3, Motor Strength 5/5 B/L upper and lower extremities; DTRs 2+ intact and symmetric  CHEST/LUNG: Clear to auscultation bilaterally; No rales, rhonchi, wheezing, or rubs  HEART: Regular rate and rhythm; No murmurs, rubs, or gallops  ABDOMEN: Soft, Nontender, Nondistended; Bowel sounds present  EXTREMITIES:  2+ Peripheral Pulses, No clubbing, cyanosis, or edema        DVT/GI ppx  Discussed with pt @ bedside CHIEF COMPLAINT/INTERVAL HISTORY:    Patient is a 69y old  Female who presents with a chief complaint of Comminuted intratrochanteric fracture of the right hip (28 Jan 2020 07:18)    SUBJECTIVE & OBJECTIVE: Pt seen and examined at bedside. No overnight events. Pain controlled. PT evaluation today. Drop in Hb noted; no hematoma. Will continue to monitor.    ROS: No chest pain, palpitations, SOB, light headedness, dizziness, headache, nausea/vomiting, fevers/chills, abdominal pain, dysuria.    ICU Vital Signs Last 24 Hrs  T(C): 36.8 (28 Jan 2020 16:25), Max: 37.1 (27 Jan 2020 21:29)  T(F): 98.2 (28 Jan 2020 16:25), Max: 98.7 (27 Jan 2020 21:29)  HR: 87 (28 Jan 2020 16:25) (84 - 97)  BP: 100/57 (28 Jan 2020 16:25) (93/57 - 121/67)  RR: 17 (28 Jan 2020 16:25) (17 - 19)  SpO2: 95% (28 Jan 2020 16:25) (90% - 96%)    MEDICATIONS  (STANDING):  acetaminophen   Tablet .. 975 milliGRAM(s) Oral every 8 hours  aspirin  chewable 81 milliGRAM(s) Oral daily  atorvastatin 10 milliGRAM(s) Oral at bedtime  enoxaparin Injectable 40 milliGRAM(s) SubCutaneous every 24 hours  lactated ringers. 1000 milliLiter(s) (120 mL/Hr) IV Continuous <Continuous>  levothyroxine 100 MICROGram(s) Oral daily  multivitamin 1 Tablet(s) Oral daily  senna 2 Tablet(s) Oral at bedtime    MEDICATIONS  (PRN):  HYDROmorphone  Injectable 0.5 milliGRAM(s) IV Push every 4 hours PRN Breakthrough Pain  oxyCODONE    IR 10 milliGRAM(s) Oral every 3 hours PRN Moderate Pain (4 - 6)  oxyCODONE    IR 5 milliGRAM(s) Oral every 3 hours PRN Mild Pain (1 - 3)      LABS:                        9.9    4.39  )-----------( 149      ( 28 Jan 2020 07:14 )             30.3     01-28    139  |  105  |  9.0  ----------------------------<  97  4.2   |  23.0  |  0.68    Ca    8.7      28 Jan 2020 07:14  Phos  2.0     01-28  Mg     1.6     01-28    TPro  7.3  /  Alb  4.2  /  TBili  0.6  /  DBili  x   /  AST  69<H>  /  ALT  27  /  AlkPhos  92  01-26    PT/INR - ( 26 Jan 2020 18:01 )   PT: 11.7 sec;   INR: 1.02 ratio         PTT - ( 26 Jan 2020 18:01 )  PTT:28.3 sec      CAPILLARY BLOOD GLUCOSE    PHYSICAL EXAM:    GENERAL: elderly female, sitting in chair, NAD  HEAD:  Atraumatic, Normocephalic  EYES: EOMI, PERRLA, conjunctiva and sclera clear  ENMT: Moist mucous membranes  NECK: Supple   NERVOUS SYSTEM:  Alert & Oriented X3   CHEST/LUNG: Clear to auscultation bilaterally; No rales, rhonchi, wheezing, or rubs  HEART: Regular rate and rhythm; + S1/S2  ABDOMEN: Soft, Nontender, Nondistended; Bowel sounds present  EXTREMITIES: no pedal edema, right hip dressings c/d/i

## 2020-01-28 NOTE — PROGRESS NOTE ADULT - ASSESSMENT
69yoF hx hypothyroidism, HLD presenting with right hip and right elbow pain after mechanical fall after slipping while walking barefoot down wet stairs, found to have comminuted intratrochanteric fracture of the right hip on CT pelvis    Intratrochanteric fracture of the right hip due to mechanical fall  -Continue analgesia as prescribed; add IV dilaudid for breakthrough     -medically optimized for OR today  -dvt ppx once cleared by ortho  -ortho consult appreciated    Right elbow contusion  -X-ray of elbow ? radial neck fracture  -discussed with ortho PA; will review imaging and discuss with attending    Hyponatremia   -resolved s/p IVF    HLD  -On lipitor     Hypothyroidism  - TSH elevated; check free T3/T4  -  continue synthroid    Active smoker  -Nicotine patch    DVT ppx - SCDs; OR today
69yoF hx hypothyroidism, HLD presenting with right hip and right elbow pain after mechanical fall after slipping while walking barefoot down wet stairs, found to have comminuted intratrochanteric fracture of the right hip on CT pelvis s/p IMN.    Intratrochanteric fracture of the right hip due to mechanical fall  -s/p IMN POD #1  -Continue analgesia as prescribed   -PT evaluation; probable home pending stair training on 1/29  -dvt ppx as prescribed  -ortho consult appreciated    Anemia  -likely due to blood loss; no large hematoma on exam  -monitor H/H     Right elbow contusion  -X-ray of elbow - normal ROM, denies pain  -discussed with ortho PA; no further imaging required    Hyponatremia   -resolved s/p IVF    HLD  -On lipitor     Hypothyroidism  - TSH elevated; normal T3/T4  -  continue synthroid at current dose, outpatient follow up with endo    Active smoker  -Nicotine patch    DVT ppx - Lovenox SC

## 2020-01-28 NOTE — PHYSICAL THERAPY INITIAL EVALUATION ADULT - IMPAIRMENTS FOUND, PT EVAL
muscle strength/neuromotor development and sensory integration/ROM/aerobic capacity/endurance/gait, locomotion, and balance

## 2020-01-28 NOTE — PROVIDER CONTACT NOTE (OTHER) - REASON
Chart Reviewed,. Events noted for Surgical Intervention. PT request updated MD orders for PT Consult

## 2020-01-28 NOTE — OCCUPATIONAL THERAPY INITIAL EVALUATION ADULT - LEVEL OF INDEPENDENCE:TOILET, OT EVAL
minimum assist (75% patients effort)/+urination on toilet during session; pt able to perform tasks in sitting with supervision however requiring assistance with standing tasks for balance

## 2020-01-28 NOTE — PHYSICAL THERAPY INITIAL EVALUATION ADULT - LIVES WITH, PROFILE
spouse/Pt lives with spouse in private home, 1 BRODIE with rails (back entrance), 5 steps inside which pt does not plan on doing

## 2020-01-28 NOTE — PHYSICAL THERAPY INITIAL EVALUATION ADULT - PLANNED THERAPY INTERVENTIONS, PT EVAL
transfer training/gait training/bed mobility training/balance training/strengthening/motor coordination training/neuromuscular re-education/ROM/stretching/postural re-education

## 2020-01-28 NOTE — OCCUPATIONAL THERAPY INITIAL EVALUATION ADULT - PERTINENT HX OF CURRENT PROBLEM, REHAB EVAL
Pt presents to ED with c/o right hip and right elbow pain s/p fall the night prior. CT reveals comminuted intratrochanteric fracture of the right hip.

## 2020-01-28 NOTE — OCCUPATIONAL THERAPY INITIAL EVALUATION ADULT - ADDITIONAL COMMENTS
Pt lives in house with 1 BRODIE and no steps inside; pt will be staying on main level upon discharge. Bathroom has bathtub with doors. Pt owns RW. Pt is right handed. Pt drives. Pt's  is able and available to assist upon discharge.

## 2020-01-28 NOTE — PHYSICAL THERAPY INITIAL EVALUATION ADULT - ADDITIONAL COMMENTS
Pt reports independent PTA, owns no DME. Pt lives in split level home, however, reports she will stay on bottom half to avoid 5 steps. +. Spouse is available to assist as needed

## 2020-01-29 ENCOUNTER — TRANSCRIPTION ENCOUNTER (OUTPATIENT)
Age: 70
End: 2020-01-29

## 2020-01-29 VITALS
DIASTOLIC BLOOD PRESSURE: 70 MMHG | RESPIRATION RATE: 18 BRPM | HEART RATE: 96 BPM | TEMPERATURE: 97 F | OXYGEN SATURATION: 90 % | SYSTOLIC BLOOD PRESSURE: 109 MMHG

## 2020-01-29 LAB
ANION GAP SERPL CALC-SCNC: 12 MMOL/L — SIGNIFICANT CHANGE UP (ref 5–17)
BUN SERPL-MCNC: 9 MG/DL — SIGNIFICANT CHANGE UP (ref 8–20)
CALCIUM SERPL-MCNC: 9.4 MG/DL — SIGNIFICANT CHANGE UP (ref 8.6–10.2)
CHLORIDE SERPL-SCNC: 104 MMOL/L — SIGNIFICANT CHANGE UP (ref 98–107)
CO2 SERPL-SCNC: 24 MMOL/L — SIGNIFICANT CHANGE UP (ref 22–29)
CREAT SERPL-MCNC: 0.59 MG/DL — SIGNIFICANT CHANGE UP (ref 0.5–1.3)
GLUCOSE SERPL-MCNC: 96 MG/DL — SIGNIFICANT CHANGE UP (ref 70–99)
HCT VFR BLD CALC: 31.2 % — LOW (ref 34.5–45)
HGB BLD-MCNC: 10.2 G/DL — LOW (ref 11.5–15.5)
MCHC RBC-ENTMCNC: 30.7 PG — SIGNIFICANT CHANGE UP (ref 27–34)
MCHC RBC-ENTMCNC: 32.7 GM/DL — SIGNIFICANT CHANGE UP (ref 32–36)
MCV RBC AUTO: 94 FL — SIGNIFICANT CHANGE UP (ref 80–100)
PLATELET # BLD AUTO: 156 K/UL — SIGNIFICANT CHANGE UP (ref 150–400)
POTASSIUM SERPL-MCNC: 4.3 MMOL/L — SIGNIFICANT CHANGE UP (ref 3.5–5.3)
POTASSIUM SERPL-SCNC: 4.3 MMOL/L — SIGNIFICANT CHANGE UP (ref 3.5–5.3)
RBC # BLD: 3.32 M/UL — LOW (ref 3.8–5.2)
RBC # FLD: 12.9 % — SIGNIFICANT CHANGE UP (ref 10.3–14.5)
SODIUM SERPL-SCNC: 140 MMOL/L — SIGNIFICANT CHANGE UP (ref 135–145)
WBC # BLD: 5.11 K/UL — SIGNIFICANT CHANGE UP (ref 3.8–10.5)
WBC # FLD AUTO: 5.11 K/UL — SIGNIFICANT CHANGE UP (ref 3.8–10.5)

## 2020-01-29 PROCEDURE — 72192 CT PELVIS W/O DYE: CPT

## 2020-01-29 PROCEDURE — 93005 ELECTROCARDIOGRAM TRACING: CPT

## 2020-01-29 PROCEDURE — 99239 HOSP IP/OBS DSCHRG MGMT >30: CPT

## 2020-01-29 PROCEDURE — 85610 PROTHROMBIN TIME: CPT

## 2020-01-29 PROCEDURE — 80048 BASIC METABOLIC PNL TOTAL CA: CPT

## 2020-01-29 PROCEDURE — 83735 ASSAY OF MAGNESIUM: CPT

## 2020-01-29 PROCEDURE — 76000 FLUOROSCOPY <1 HR PHYS/QHP: CPT

## 2020-01-29 PROCEDURE — 73502 X-RAY EXAM HIP UNI 2-3 VIEWS: CPT

## 2020-01-29 PROCEDURE — 84481 FREE ASSAY (FT-3): CPT

## 2020-01-29 PROCEDURE — 84439 ASSAY OF FREE THYROXINE: CPT

## 2020-01-29 PROCEDURE — 99285 EMERGENCY DEPT VISIT HI MDM: CPT | Mod: 25

## 2020-01-29 PROCEDURE — C1713: CPT

## 2020-01-29 PROCEDURE — 96374 THER/PROPH/DIAG INJ IV PUSH: CPT

## 2020-01-29 PROCEDURE — 86850 RBC ANTIBODY SCREEN: CPT

## 2020-01-29 PROCEDURE — 76377 3D RENDER W/INTRP POSTPROCES: CPT

## 2020-01-29 PROCEDURE — 86901 BLOOD TYPING SEROLOGIC RH(D): CPT

## 2020-01-29 PROCEDURE — 84100 ASSAY OF PHOSPHORUS: CPT

## 2020-01-29 PROCEDURE — 97167 OT EVAL HIGH COMPLEX 60 MIN: CPT

## 2020-01-29 PROCEDURE — 84443 ASSAY THYROID STIM HORMONE: CPT

## 2020-01-29 PROCEDURE — 36415 COLL VENOUS BLD VENIPUNCTURE: CPT

## 2020-01-29 PROCEDURE — 73552 X-RAY EXAM OF FEMUR 2/>: CPT

## 2020-01-29 PROCEDURE — 86900 BLOOD TYPING SEROLOGIC ABO: CPT

## 2020-01-29 PROCEDURE — 85027 COMPLETE CBC AUTOMATED: CPT

## 2020-01-29 PROCEDURE — 80053 COMPREHEN METABOLIC PANEL: CPT

## 2020-01-29 PROCEDURE — 73070 X-RAY EXAM OF ELBOW: CPT

## 2020-01-29 PROCEDURE — 85730 THROMBOPLASTIN TIME PARTIAL: CPT

## 2020-01-29 PROCEDURE — 86803 HEPATITIS C AB TEST: CPT

## 2020-01-29 RX ORDER — OXYCODONE HYDROCHLORIDE 5 MG/1
1 TABLET ORAL
Qty: 20 | Refills: 0
Start: 2020-01-29 | End: 2020-02-02

## 2020-01-29 RX ORDER — SENNA PLUS 8.6 MG/1
2 TABLET ORAL
Qty: 14 | Refills: 0
Start: 2020-01-29 | End: 2020-02-04

## 2020-01-29 RX ORDER — OXYCODONE HYDROCHLORIDE 5 MG/1
1 TABLET ORAL
Qty: 0 | Refills: 0 | DISCHARGE
Start: 2020-01-29

## 2020-01-29 RX ORDER — ENOXAPARIN SODIUM 100 MG/ML
40 INJECTION SUBCUTANEOUS
Qty: 30 | Refills: 0
Start: 2020-01-29 | End: 2020-02-27

## 2020-01-29 RX ADMIN — CELECOXIB 200 MILLIGRAM(S): 200 CAPSULE ORAL at 05:42

## 2020-01-29 RX ADMIN — Medication 1 TABLET(S): at 11:03

## 2020-01-29 RX ADMIN — Medication 100 MICROGRAM(S): at 05:42

## 2020-01-29 RX ADMIN — OXYCODONE HYDROCHLORIDE 10 MILLIGRAM(S): 5 TABLET ORAL at 13:34

## 2020-01-29 RX ADMIN — OXYCODONE HYDROCHLORIDE 5 MILLIGRAM(S): 5 TABLET ORAL at 11:26

## 2020-01-29 RX ADMIN — Medication 81 MILLIGRAM(S): at 11:03

## 2020-01-29 RX ADMIN — Medication 975 MILLIGRAM(S): at 06:12

## 2020-01-29 RX ADMIN — OXYCODONE HYDROCHLORIDE 10 MILLIGRAM(S): 5 TABLET ORAL at 14:41

## 2020-01-29 RX ADMIN — OXYCODONE HYDROCHLORIDE 10 MILLIGRAM(S): 5 TABLET ORAL at 06:12

## 2020-01-29 RX ADMIN — OXYCODONE HYDROCHLORIDE 10 MILLIGRAM(S): 5 TABLET ORAL at 05:43

## 2020-01-29 RX ADMIN — ENOXAPARIN SODIUM 40 MILLIGRAM(S): 100 INJECTION SUBCUTANEOUS at 05:42

## 2020-01-29 RX ADMIN — Medication 975 MILLIGRAM(S): at 05:42

## 2020-01-29 RX ADMIN — CELECOXIB 200 MILLIGRAM(S): 200 CAPSULE ORAL at 06:12

## 2020-01-29 RX ADMIN — OXYCODONE HYDROCHLORIDE 5 MILLIGRAM(S): 5 TABLET ORAL at 11:03

## 2020-01-29 NOTE — DISCHARGE NOTE NURSING/CASE MANAGEMENT/SOCIAL WORK - PATIENT PORTAL LINK FT
You can access the FollowMyHealth Patient Portal offered by [facility] by registering at the following website: [email or web address]. By joining [employer]’s FollowMyHealth portal, you will also be able to view your health information using other applications (apps) compatible with our system.

## 2020-01-29 NOTE — PROGRESS NOTE ADULT - SUBJECTIVE AND OBJECTIVE BOX
DINORA COULTER    [id number]    History: Patient is status post right hip IM nail, POD#2.  Patient is doing well. The patient's pain is controlled using the prescribed pain medications. The patient is participating in physical therapy, WBAT.  Denies nausea, vomiting, chest pain, shortness of breath, abdominal pain or fever. No new complaints.    Vital Signs Last 24 Hrs  T(C): 36.7 (29 Jan 2020 04:56), Max: 37 (28 Jan 2020 13:47)  T(F): 98.1 (29 Jan 2020 04:56), Max: 98.6 (28 Jan 2020 13:47)  HR: 88 (29 Jan 2020 04:56) (82 - 88)  BP: 108/62 (29 Jan 2020 04:56) (93/57 - 119/61)  BP(mean): --  RR: 17 (29 Jan 2020 04:56) (16 - 18)  SpO2: 90% (29 Jan 2020 04:56) (90% - 96%)                          10.2   5.11  )-----------( 156      ( 29 Jan 2020 06:52 )             31.2       01-28    139  |  105  |  9.0  ----------------------------<  97  4.2   |  23.0  |  0.68    Ca    8.7      28 Jan 2020 07:14  Phos  2.0     01-28  Mg     1.6     01-28        MEDICATIONS  (STANDING):  acetaminophen   Tablet .. 975 milliGRAM(s) Oral every 8 hours  aspirin  chewable 81 milliGRAM(s) Oral daily  atorvastatin 10 milliGRAM(s) Oral at bedtime  celecoxib 200 milliGRAM(s) Oral two times a day  enoxaparin Injectable 40 milliGRAM(s) SubCutaneous every 24 hours  lactated ringers. 1000 milliLiter(s) (120 mL/Hr) IV Continuous <Continuous>  levothyroxine 100 MICROGram(s) Oral daily  multivitamin 1 Tablet(s) Oral daily  senna 2 Tablet(s) Oral at bedtime    MEDICATIONS  (PRN):  HYDROmorphone  Injectable 0.5 milliGRAM(s) IV Push every 4 hours PRN Breakthrough Pain  oxyCODONE    IR 10 milliGRAM(s) Oral every 3 hours PRN Moderate Pain (4 - 6)  oxyCODONE    IR 5 milliGRAM(s) Oral every 3 hours PRN Mild Pain (1 - 3)      Physical exam: The right hip surgical dressing is clean, dry and intact. Surgical dressings removed and new dressings placed.  Incision is clean dry and intact, streri strips remain in place. No drainage or discharge. No erythema is noted. No blistering. No ecchymosis. The calf is supple nontender. Passive range of motion is acceptable to due postoperative pain. No calf tenderness. Sensation to light touch is grossly intact distally. Motor function distally is 5/5. No foot drop. 2+ dorsalis pedis pulse. Capillary refill is less than 2 seconds. No cyanosis.    Primary Orthopedic Assessment:  • s/p RIGHT hip IM nail  Secondary  Orthopedic Assessment(s):   •     Secondary  Medical Assessment(s):   •     Plan:   • DVT prophylaxis as prescribed, including use of compression devices and ankle pumps, Lovenox 40 mg daily for a duration of 4 weeks post operatvely  • Continue physical therapy, WBAT RLE  • Weightbearing as tolerated of the right lower extremity with assistance of a walker  • Incentive spirometry encouraged  • Pain control as clinically indicated  • Discharge planning – anticipated discharge is Home  oncle cleared by PT for stair training.

## 2020-01-29 NOTE — PROGRESS NOTE ADULT - REASON FOR ADMISSION
Comminuted intratrochanteric fracture of the right hip

## 2020-01-30 PROBLEM — E03.9 HYPOTHYROIDISM, UNSPECIFIED: Chronic | Status: ACTIVE | Noted: 2020-01-27

## 2020-01-30 PROBLEM — Z00.00 ENCOUNTER FOR PREVENTIVE HEALTH EXAMINATION: Status: ACTIVE | Noted: 2020-01-30

## 2020-01-30 PROBLEM — E78.5 HYPERLIPIDEMIA, UNSPECIFIED: Chronic | Status: ACTIVE | Noted: 2020-01-27

## 2020-02-11 ENCOUNTER — APPOINTMENT (OUTPATIENT)
Dept: ORTHOPEDIC SURGERY | Facility: CLINIC | Age: 70
End: 2020-02-11
Payer: MEDICARE

## 2020-02-11 DIAGNOSIS — Z78.9 OTHER SPECIFIED HEALTH STATUS: ICD-10-CM

## 2020-02-11 DIAGNOSIS — Z86.39 PERSONAL HISTORY OF OTHER ENDOCRINE, NUTRITIONAL AND METABOLIC DISEASE: ICD-10-CM

## 2020-02-11 DIAGNOSIS — Z80.1 FAMILY HISTORY OF MALIGNANT NEOPLASM OF TRACHEA, BRONCHUS AND LUNG: ICD-10-CM

## 2020-02-11 PROCEDURE — 73502 X-RAY EXAM HIP UNI 2-3 VIEWS: CPT | Mod: RT

## 2020-02-11 PROCEDURE — 99024 POSTOP FOLLOW-UP VISIT: CPT

## 2020-03-04 NOTE — PATIENT PROFILE ADULT - NSASFUNCLEVELADLTRANSFER_GEN_A_NUR
Chief Complaint   Patient presents with   Eligah Flavors     #[phone], no meds, no glass 3 = assistive equipment and person

## 2020-03-18 ENCOUNTER — APPOINTMENT (OUTPATIENT)
Dept: ORTHOPEDIC SURGERY | Facility: CLINIC | Age: 70
End: 2020-03-18

## 2020-05-23 NOTE — ED STATDOCS - EYES, MLM
clear bilaterally.  Pupils equal, round, and reactive to light. Oriented - self; Oriented - place; Oriented - time

## 2021-09-21 ENCOUNTER — APPOINTMENT (OUTPATIENT)
Dept: ORTHOPEDIC SURGERY | Facility: CLINIC | Age: 71
End: 2021-09-21
Payer: MEDICARE

## 2021-09-21 VITALS
DIASTOLIC BLOOD PRESSURE: 89 MMHG | HEIGHT: 62 IN | HEART RATE: 100 BPM | BODY MASS INDEX: 24.48 KG/M2 | WEIGHT: 133 LBS | SYSTOLIC BLOOD PRESSURE: 145 MMHG | TEMPERATURE: 97.9 F

## 2021-09-21 PROCEDURE — 99214 OFFICE O/P EST MOD 30 MIN: CPT

## 2021-09-21 PROCEDURE — 73502 X-RAY EXAM HIP UNI 2-3 VIEWS: CPT | Mod: RT

## 2021-09-21 NOTE — ADDENDUM
[FreeTextEntry1] : Documented by Ben Kaufman acting as a scribe for Akua Marr on 09/21/2021 . All medical record entries made by the Scribe were at my, Akua Marr , direction and personally dictated by me on 09/21/2021  . I have reviewed the chart and agree that the record accurately reflects my personal performance of the history, physical exam, assessment and plan. I have also personally directed, reviewed, and agreed with the chart.

## 2021-09-21 NOTE — PHYSICAL EXAM
[Poor Appearance] : well-appearing [Obese] : not obese [Acute Distress] : not in acute distress [de-identified] : Physical Exam:\par General: Well appearing, no acute distress, A&O\par Neurologic: A&Ox3, No focal deficits\par Head: NCAT without abrasions, lacerations, or ecchymosis to head, face, or scalp\par Respiratory: Equal chest wall expansion bilaterally, no accessory muscle use\par Lymphatic: No lymphadenopathy palpated\par Skin: Warm and dry\par Psychiatric: Normal mood and affect\par \par Right Hip Exam:\par \par Skin: Clean/dry and intact\par Inspection: No obvious deformity, no swelling.\par Pulses: 2+ DP/PT pulses\par ROM: Full ROM with flexion. Full ROM with internal and external rotation.\par Painful ROM: None, No groin pain. \par Tenderness: + Tenderness over greater trochanter right. + tenderness at gluteal insertion. No paraspinal tenderness. No axial lumbar tenderness.No sacroiliac tenderness. No ttp over the ASIS/[place]iac crest.\par Strength: 5/5 hip flexion, 5/5 Gluteal strength,5/5 hip ADD, 5/5 hip ABD, 5/5 Q/H, 5/5 TA/GS/EHL\par Neuro: Sensation in tact to light touch throughout, DTR normal\par Additional tests: Negative impingement test, Negative JUVENTINO  [de-identified] : Xray of the right hip taken 09/21/2021 in office demonstrates arthritic changes of the right hip, moderate degenerative joint disease.

## 2021-09-21 NOTE — DISCUSSION/SUMMARY
[de-identified] : We talked about the nature of the condition and treatment options. Anticipatory guidance regarding degenerative joint disease of the right hip was given. I advised the patient on antiinflammatory use such as ibuprofen 600 MG BID / PRN pain not to exceed 1200MG daily. I will provide a Medrol dose pack for pain relief, patient was educated on the antiinflammatory properties of this medication and how this will help their pain. Patient has been referred to physical therapy for decreased pain modalities, stretching and strengthening modalities, soft tissue modalities, and physical modalities. Patient has restarted her HDL medication. Patient was prescribed Methocarbamol 750Mg QD / PRN spasm. Advised follow up with PCP regarding sleep medication. The patient will follow up in 4 - 6 weeks for a repeat clinical assessment If pain persists at this point we will consider ordering an MRI to further assess these complaints. \par \par Prior to appointment and during encounter with patient extensive medical records were reviewed including but not limited to, hospital records, out patient records, imaging results, and lab data. During this appointment the patient was examined, diagnoses were discussed and explained in a face to face manner. In addition extensive time was spent reviewing aforementioned diagnostic studies. Counseling including abnormal image results, differential diagnoses, treatment options, risk and benefits, lifestyle changes, current condition, and current medications was performed. Patient's comments, questions, and concerns were address and patient verbalized understanding.\par \par Orthopaedic Trauma Surgeon Addendum:\par \par I have reviewed the physician assistant note and agree with the history, exam, and plan of care, except as noted.\par \par Randy Beaver MD\par Orthopaedic Trauma Surgeon\par [facility]\par [facility]

## 2021-09-21 NOTE — HISTORY OF PRESENT ILLNESS
[] : pain [de-identified] : 71 year old F Presents for follow up evaluation S/p right hip IM nail 1/2020. She states the year after her surgery she felt great. In June 2021 she was very active in her home which has a lot of stairs, she now complains of pain in the right hip and right groin pain. She is taking Advil with no relief. She states she can not sleep well due to pain. She states she has spoken to her PCP regarding correlation of this pain to her HLD medication, she has stopped this medication with no change in pain.  [4] : a current pain level of 4/10 [de-identified] : aching

## 2022-11-23 ENCOUNTER — APPOINTMENT (OUTPATIENT)
Dept: ORTHOPEDIC SURGERY | Facility: CLINIC | Age: 72
End: 2022-11-23

## 2022-11-23 VITALS
DIASTOLIC BLOOD PRESSURE: 79 MMHG | HEIGHT: 62 IN | HEART RATE: 89 BPM | WEIGHT: 133 LBS | BODY MASS INDEX: 24.48 KG/M2 | SYSTOLIC BLOOD PRESSURE: 119 MMHG

## 2022-11-23 DIAGNOSIS — M67.951 UNSPECIFIED DISORDER OF SYNOVIUM AND TENDON, RIGHT THIGH: ICD-10-CM

## 2022-11-23 DIAGNOSIS — M70.61 TROCHANTERIC BURSITIS, RIGHT HIP: ICD-10-CM

## 2022-11-23 PROCEDURE — 73502 X-RAY EXAM HIP UNI 2-3 VIEWS: CPT

## 2022-11-23 PROCEDURE — 99214 OFFICE O/P EST MOD 30 MIN: CPT

## 2022-11-23 NOTE — REVIEW OF SYSTEMS
[Joint Pain] : joint pain [Joint Stiffness] : joint stiffness [Joint Swelling] : joint swelling [Negative] : Heme/Lymph [[email or web address]] : right hip and leg pain

## 2022-11-23 NOTE — PHYSICAL EXAM
[Poor Appearance] : well-appearing [Acute Distress] : not in acute distress [Obese] : not obese [de-identified] : Physical Exam:\par General: Well appearing, no acute distress, A&O\par Neurologic: A&Ox3, No focal deficits\par Head: NCAT without abrasions, lacerations, or ecchymosis to head, face, or scalp\par Respiratory: Equal chest wall expansion bilaterally, no accessory muscle use\par Lymphatic: No lymphadenopathy palpated\par Skin: Warm and dry\par Psychiatric: Normal mood and affect\par \par Right Hip Exam:\par \par Skin: Clean/dry and intact\par Inspection: No obvious deformity, no swelling.\par Pulses: 2+ DP/PT pulses\par ROM: Full ROM with flexion. Full ROM with internal and external rotation.\par Painful ROM: None, No groin pain. \par Tenderness: + Tenderness over greater trochanter right. + tenderness at gluteal insertion. No paraspinal tenderness. No axial lumbar tenderness.No sacroiliac tenderness. No ttp over the ASIS/Illiac crest.\par Strength: 5/5 hip flexion, 5/5 Gluteal strength,5/5 hip ADD, 5/5 hip ABD, 5/5 Q/H, 5/5 TA/GS/EHL\par Neuro: Sensation in tact to light touch throughout, DTR normal\par Additional tests: Negative impingement test, Negative JUVENTINO  [de-identified] : 2 view Xray of the right hip taken today in office demonstrates arthritic changes of the right hip, moderate degenerative joint disease, well-healed intertrochanteric hip fracture

## 2022-11-23 NOTE — DISCUSSION/SUMMARY
[de-identified] : 72-year-old female status post right intertrochanteric hip fracture.  The fracture is well-healed but she does have some trochanteric pain as well as gluteal muscle pain.  We have offered physical therapy in the past but she is politely declining today.  I have offered her a referral to physical medicine and rehab for discussion of alternative pain management and possible injection therapy.  She will consider her options.  She does have worsening arthritis in the hip so I offered her referral to the arthroplasty service as well but again she politely declined.  No orthopedic trauma intervention is required but we will help facilitate future care as needed. The patient may follow up as needed.\par \par Osteopenia and osteoporosis are significant risk factors for fragility fractures. Given the type of fracture that has occurred, I would highly recommend that the patient followup with their primary care physician to discuss treatment for low bone mineral density. We discussed the benefits of these medications frequently far outweigh the small risks. Their primary care physician can call the office with any specific questions or concerns.\par \par The patient was given the opportunity to ask questions and all questions were answered to their satisfaction.\par \par Randy Beaver MD\par Orthopaedic Trauma Surgeon\par [facility]\par [facility] [place]\par Director Orthopaedic Trauma, [facility]\par \par \par \par

## 2022-11-23 NOTE — HISTORY OF PRESENT ILLNESS
[de-identified] : 72 year old F Presents for follow up evaluation S/p right hip IM nail 1/2020. She states the year after her surgery she felt great. In June 2021 she was very active in her home which has a lot of stairs, she now complains of cont'd pain in the right hip and right groin pain.\par Pain levels include a current pain level of 4/10 and aching. \par \par  [Bending] : worsened by bending [Lifting] : worsened by lifting [Recumbency] : relieved by recumbency [Rest] : relieved by rest

## 2024-03-22 ENCOUNTER — APPOINTMENT (OUTPATIENT)
Dept: CARDIOLOGY | Facility: CLINIC | Age: 74
End: 2024-03-22
Payer: MEDICARE

## 2024-03-22 VITALS
HEIGHT: 62 IN | HEART RATE: 82 BPM | SYSTOLIC BLOOD PRESSURE: 140 MMHG | DIASTOLIC BLOOD PRESSURE: 70 MMHG | RESPIRATION RATE: 16 BRPM | WEIGHT: 130 LBS | BODY MASS INDEX: 23.92 KG/M2

## 2024-03-22 DIAGNOSIS — Z87.81 PERSONAL HISTORY OF (HEALED) TRAUMATIC FRACTURE: ICD-10-CM

## 2024-03-22 DIAGNOSIS — Z74.09 OTHER REDUCED MOBILITY: ICD-10-CM

## 2024-03-22 DIAGNOSIS — F17.200 NICOTINE DEPENDENCE, UNSPECIFIED, UNCOMPLICATED: ICD-10-CM

## 2024-03-22 DIAGNOSIS — Z87.891 PERSONAL HISTORY OF NICOTINE DEPENDENCE: ICD-10-CM

## 2024-03-22 DIAGNOSIS — R09.89 OTHER SPECIFIED SYMPTOMS AND SIGNS INVOLVING THE CIRCULATORY AND RESPIRATORY SYSTEMS: ICD-10-CM

## 2024-03-22 DIAGNOSIS — R01.1 CARDIAC MURMUR, UNSPECIFIED: ICD-10-CM

## 2024-03-22 PROCEDURE — G2211 COMPLEX E/M VISIT ADD ON: CPT

## 2024-03-22 PROCEDURE — 99204 OFFICE O/P NEW MOD 45 MIN: CPT

## 2024-03-22 PROCEDURE — 93000 ELECTROCARDIOGRAM COMPLETE: CPT

## 2024-03-22 RX ORDER — MELOXICAM 15 MG/1
15 TABLET ORAL
Refills: 0 | Status: ACTIVE | COMMUNITY

## 2024-03-22 RX ORDER — METHOCARBAMOL 500 MG/1
500 TABLET, FILM COATED ORAL
Qty: 30 | Refills: 1 | Status: DISCONTINUED | COMMUNITY
Start: 2021-09-21 | End: 2024-03-22

## 2024-03-22 RX ORDER — ASPIRIN 81 MG
81 TABLET, DELAYED RELEASE (ENTERIC COATED) ORAL
Refills: 0 | Status: ACTIVE | COMMUNITY

## 2024-03-22 RX ORDER — OXYCODONE 5 MG/1
5 TABLET ORAL EVERY 6 HOURS
Qty: 20 | Refills: 0 | Status: DISCONTINUED | COMMUNITY
Start: 2020-02-06 | End: 2024-03-22

## 2024-03-22 RX ORDER — ALENDRONATE SODIUM 70 MG/1
70 TABLET ORAL
Refills: 0 | Status: ACTIVE | COMMUNITY

## 2024-03-22 RX ORDER — METHYLPREDNISOLONE 4 MG/1
4 TABLET ORAL
Qty: 1 | Refills: 0 | Status: DISCONTINUED | COMMUNITY
Start: 2021-09-21 | End: 2024-03-22

## 2024-03-22 RX ORDER — ATORVASTATIN CALCIUM 10 MG/1
10 TABLET, FILM COATED ORAL
Refills: 0 | Status: ACTIVE | COMMUNITY

## 2024-03-22 RX ORDER — TRAMADOL HYDROCHLORIDE 50 MG/1
50 TABLET, COATED ORAL
Refills: 0 | Status: ACTIVE | COMMUNITY

## 2024-03-22 RX ORDER — LEVOTHYROXINE SODIUM 0.12 MG/1
125 TABLET ORAL
Refills: 0 | Status: ACTIVE | COMMUNITY

## 2024-03-22 RX ORDER — OXYCODONE 5 MG/1
5 TABLET ORAL EVERY 6 HOURS
Qty: 20 | Refills: 0 | Status: DISCONTINUED | COMMUNITY
Start: 2020-01-31 | End: 2024-03-22

## 2024-03-25 ENCOUNTER — MED ADMIN CHARGE (OUTPATIENT)
Age: 74
End: 2024-03-25

## 2024-03-25 ENCOUNTER — APPOINTMENT (OUTPATIENT)
Dept: CARDIOLOGY | Facility: CLINIC | Age: 74
End: 2024-03-25
Payer: MEDICARE

## 2024-03-25 DIAGNOSIS — R94.31 ABNORMAL ELECTROCARDIOGRAM [ECG] [EKG]: ICD-10-CM

## 2024-03-25 PROCEDURE — 78452 HT MUSCLE IMAGE SPECT MULT: CPT

## 2024-03-25 PROCEDURE — A9500: CPT

## 2024-03-25 PROCEDURE — 93015 CV STRESS TEST SUPVJ I&R: CPT

## 2024-03-25 RX ADMIN — REGADENOSON 0.04 MG/5ML: 0.08 INJECTION, SOLUTION INTRAVENOUS at 00:00

## 2024-04-11 ENCOUNTER — APPOINTMENT (OUTPATIENT)
Dept: CARDIOLOGY | Facility: CLINIC | Age: 74
End: 2024-04-11
Payer: MEDICARE

## 2024-04-11 PROCEDURE — 93880 EXTRACRANIAL BILAT STUDY: CPT

## 2024-04-11 PROCEDURE — 93306 TTE W/DOPPLER COMPLETE: CPT

## 2024-04-17 ENCOUNTER — TRANSCRIPTION ENCOUNTER (OUTPATIENT)
Age: 74
End: 2024-04-17

## 2024-04-24 PROBLEM — R94.31 ABNORMAL EKG: Status: ACTIVE | Noted: 2024-04-24

## 2024-04-24 RX ORDER — KIT FOR THE PREPARATION OF TECHNETIUM TC99M SESTAMIBI 1 MG/5ML
INJECTION, POWDER, LYOPHILIZED, FOR SOLUTION PARENTERAL
Refills: 0 | Status: COMPLETED | OUTPATIENT
Start: 2024-04-24

## 2024-04-24 RX ADMIN — KIT FOR THE PREPARATION OF TECHNETIUM TC99M SESTAMIBI 0: 1 INJECTION, POWDER, LYOPHILIZED, FOR SOLUTION PARENTERAL at 00:00

## 2024-05-15 RX ORDER — REGADENOSON 0.08 MG/ML
0.4 INJECTION, SOLUTION INTRAVENOUS
Qty: 4 | Refills: 0 | Status: COMPLETED | OUTPATIENT
Start: 2024-03-25

## 2024-05-22 ENCOUNTER — TRANSCRIPTION ENCOUNTER (OUTPATIENT)
Age: 74
End: 2024-05-22

## 2024-12-09 NOTE — ED STATDOCS - CCCP TRG CHIEF CMPLNT
Patient notified to confirm appointment that is scheduled with PCP on 6/8/22 for hospital follow up, appointment is confirmed.     
Speaking Coherently
hip pain/injury/Fall